# Patient Record
Sex: FEMALE | Race: BLACK OR AFRICAN AMERICAN | NOT HISPANIC OR LATINO
[De-identification: names, ages, dates, MRNs, and addresses within clinical notes are randomized per-mention and may not be internally consistent; named-entity substitution may affect disease eponyms.]

---

## 2020-02-21 ENCOUNTER — TRANSCRIPTION ENCOUNTER (OUTPATIENT)
Age: 85
End: 2020-02-21

## 2020-02-22 ENCOUNTER — INPATIENT (INPATIENT)
Facility: HOSPITAL | Age: 85
LOS: 2 days | Discharge: NURSING, REHAB OR LONG-TERM CARE | End: 2020-02-25
Attending: ORTHOPAEDIC SURGERY | Admitting: ORTHOPAEDIC SURGERY
Payer: MEDICARE

## 2020-02-22 ENCOUNTER — RESULT REVIEW (OUTPATIENT)
Age: 85
End: 2020-02-22

## 2020-02-22 VITALS
TEMPERATURE: 98 F | RESPIRATION RATE: 18 BRPM | OXYGEN SATURATION: 97 % | DIASTOLIC BLOOD PRESSURE: 69 MMHG | SYSTOLIC BLOOD PRESSURE: 167 MMHG | HEART RATE: 75 BPM | WEIGHT: 149.91 LBS

## 2020-02-22 LAB
24R-OH-CALCIDIOL SERPL-MCNC: 23.2 NG/ML — LOW (ref 30–80)
ABO RH CONFIRMATION: SIGNIFICANT CHANGE UP
ALBUMIN SERPL ELPH-MCNC: 3.5 G/DL — SIGNIFICANT CHANGE UP (ref 3.3–5)
ALBUMIN SERPL ELPH-MCNC: 3.6 G/DL — SIGNIFICANT CHANGE UP (ref 3.3–5)
ALP SERPL-CCNC: 112 U/L — SIGNIFICANT CHANGE UP (ref 40–120)
ALT FLD-CCNC: 19 U/L — SIGNIFICANT CHANGE UP (ref 12–78)
ANION GAP SERPL CALC-SCNC: 5 MMOL/L — SIGNIFICANT CHANGE UP (ref 5–17)
APTT BLD: 29.6 SEC — SIGNIFICANT CHANGE UP (ref 28.5–37)
AST SERPL-CCNC: 18 U/L — SIGNIFICANT CHANGE UP (ref 15–37)
BASOPHILS # BLD AUTO: 0.02 K/UL — SIGNIFICANT CHANGE UP (ref 0–0.2)
BASOPHILS NFR BLD AUTO: 0.1 % — SIGNIFICANT CHANGE UP (ref 0–2)
BILIRUB SERPL-MCNC: 0.4 MG/DL — SIGNIFICANT CHANGE UP (ref 0.2–1.2)
BLD GP AB SCN SERPL QL: SIGNIFICANT CHANGE UP
BUN SERPL-MCNC: 13 MG/DL — SIGNIFICANT CHANGE UP (ref 7–23)
CALCIUM SERPL-MCNC: 10.6 MG/DL — HIGH (ref 8.5–10.1)
CHLORIDE SERPL-SCNC: 111 MMOL/L — HIGH (ref 96–108)
CO2 SERPL-SCNC: 25 MMOL/L — SIGNIFICANT CHANGE UP (ref 22–31)
CREAT SERPL-MCNC: 0.72 MG/DL — SIGNIFICANT CHANGE UP (ref 0.5–1.3)
EOSINOPHIL # BLD AUTO: 0.01 K/UL — SIGNIFICANT CHANGE UP (ref 0–0.5)
EOSINOPHIL NFR BLD AUTO: 0.1 % — SIGNIFICANT CHANGE UP (ref 0–6)
GLUCOSE SERPL-MCNC: 121 MG/DL — HIGH (ref 70–99)
HCT VFR BLD CALC: 42.7 % — SIGNIFICANT CHANGE UP (ref 34.5–45)
HGB BLD-MCNC: 13.9 G/DL — SIGNIFICANT CHANGE UP (ref 11.5–15.5)
IMM GRANULOCYTES NFR BLD AUTO: 0.5 % — SIGNIFICANT CHANGE UP (ref 0–1.5)
INR BLD: 1.17 RATIO — HIGH (ref 0.88–1.16)
LYMPHOCYTES # BLD AUTO: 1.07 K/UL — SIGNIFICANT CHANGE UP (ref 1–3.3)
LYMPHOCYTES # BLD AUTO: 7 % — LOW (ref 13–44)
MCHC RBC-ENTMCNC: 30.4 PG — SIGNIFICANT CHANGE UP (ref 27–34)
MCHC RBC-ENTMCNC: 32.6 GM/DL — SIGNIFICANT CHANGE UP (ref 32–36)
MCV RBC AUTO: 93.4 FL — SIGNIFICANT CHANGE UP (ref 80–100)
MONOCYTES # BLD AUTO: 0.72 K/UL — SIGNIFICANT CHANGE UP (ref 0–0.9)
MONOCYTES NFR BLD AUTO: 4.7 % — SIGNIFICANT CHANGE UP (ref 2–14)
NEUTROPHILS # BLD AUTO: 13.29 K/UL — HIGH (ref 1.8–7.4)
NEUTROPHILS NFR BLD AUTO: 87.6 % — HIGH (ref 43–77)
NRBC # BLD: 0 /100 WBCS — SIGNIFICANT CHANGE UP (ref 0–0)
PLATELET # BLD AUTO: 253 K/UL — SIGNIFICANT CHANGE UP (ref 150–400)
POTASSIUM SERPL-MCNC: 3.7 MMOL/L — SIGNIFICANT CHANGE UP (ref 3.5–5.3)
POTASSIUM SERPL-SCNC: 3.7 MMOL/L — SIGNIFICANT CHANGE UP (ref 3.5–5.3)
PROT SERPL-MCNC: 7.8 GM/DL — SIGNIFICANT CHANGE UP (ref 6–8.3)
PROTHROM AB SERPL-ACNC: 13.2 SEC — HIGH (ref 10–12.9)
RBC # BLD: 4.57 M/UL — SIGNIFICANT CHANGE UP (ref 3.8–5.2)
RBC # FLD: 13.2 % — SIGNIFICANT CHANGE UP (ref 10.3–14.5)
SODIUM SERPL-SCNC: 141 MMOL/L — SIGNIFICANT CHANGE UP (ref 135–145)
WBC # BLD: 15.19 K/UL — HIGH (ref 3.8–10.5)
WBC # FLD AUTO: 15.19 K/UL — HIGH (ref 3.8–10.5)

## 2020-02-22 PROCEDURE — 71045 X-RAY EXAM CHEST 1 VIEW: CPT | Mod: 26

## 2020-02-22 PROCEDURE — 99221 1ST HOSP IP/OBS SF/LOW 40: CPT | Mod: AI,57,GC

## 2020-02-22 PROCEDURE — 72170 X-RAY EXAM OF PELVIS: CPT | Mod: 26,59

## 2020-02-22 PROCEDURE — 27236 TREAT THIGH FRACTURE: CPT | Mod: RT

## 2020-02-22 PROCEDURE — 73502 X-RAY EXAM HIP UNI 2-3 VIEWS: CPT | Mod: 26,RT

## 2020-02-22 PROCEDURE — 99223 1ST HOSP IP/OBS HIGH 75: CPT

## 2020-02-22 PROCEDURE — 88305 TISSUE EXAM BY PATHOLOGIST: CPT | Mod: 26

## 2020-02-22 PROCEDURE — 99285 EMERGENCY DEPT VISIT HI MDM: CPT

## 2020-02-22 PROCEDURE — 93010 ELECTROCARDIOGRAM REPORT: CPT

## 2020-02-22 PROCEDURE — 88311 DECALCIFY TISSUE: CPT | Mod: 26

## 2020-02-22 PROCEDURE — 73552 X-RAY EXAM OF FEMUR 2/>: CPT | Mod: 26,RT

## 2020-02-22 RX ORDER — CHOLECALCIFEROL (VITAMIN D3) 125 MCG
2000 CAPSULE ORAL DAILY
Refills: 0 | Status: DISCONTINUED | OUTPATIENT
Start: 2020-02-22 | End: 2020-02-25

## 2020-02-22 RX ORDER — CHLORHEXIDINE GLUCONATE 213 G/1000ML
1 SOLUTION TOPICAL DAILY
Refills: 0 | Status: DISCONTINUED | OUTPATIENT
Start: 2020-02-22 | End: 2020-02-22

## 2020-02-22 RX ORDER — LOSARTAN POTASSIUM 100 MG/1
25 TABLET, FILM COATED ORAL DAILY
Refills: 0 | Status: DISCONTINUED | OUTPATIENT
Start: 2020-02-22 | End: 2020-02-22

## 2020-02-22 RX ORDER — CEFAZOLIN SODIUM 1 G
2000 VIAL (EA) INJECTION EVERY 8 HOURS
Refills: 0 | Status: COMPLETED | OUTPATIENT
Start: 2020-02-22 | End: 2020-02-23

## 2020-02-22 RX ORDER — LOSARTAN POTASSIUM 100 MG/1
25 TABLET, FILM COATED ORAL DAILY
Refills: 0 | Status: DISCONTINUED | OUTPATIENT
Start: 2020-02-22 | End: 2020-02-25

## 2020-02-22 RX ORDER — ONDANSETRON 8 MG/1
4 TABLET, FILM COATED ORAL EVERY 6 HOURS
Refills: 0 | Status: DISCONTINUED | OUTPATIENT
Start: 2020-02-22 | End: 2020-02-25

## 2020-02-22 RX ORDER — METOCLOPRAMIDE HCL 10 MG
10 TABLET ORAL ONCE
Refills: 0 | Status: DISCONTINUED | OUTPATIENT
Start: 2020-02-22 | End: 2020-02-23

## 2020-02-22 RX ORDER — OXYCODONE HYDROCHLORIDE 5 MG/1
5 TABLET ORAL EVERY 4 HOURS
Refills: 0 | Status: DISCONTINUED | OUTPATIENT
Start: 2020-02-22 | End: 2020-02-25

## 2020-02-22 RX ORDER — OXYCODONE HYDROCHLORIDE 5 MG/1
2.5 TABLET ORAL EVERY 4 HOURS
Refills: 0 | Status: DISCONTINUED | OUTPATIENT
Start: 2020-02-22 | End: 2020-02-22

## 2020-02-22 RX ORDER — METOPROLOL TARTRATE 50 MG
25 TABLET ORAL
Refills: 0 | Status: DISCONTINUED | OUTPATIENT
Start: 2020-02-22 | End: 2020-02-22

## 2020-02-22 RX ORDER — ENOXAPARIN SODIUM 100 MG/ML
40 INJECTION SUBCUTANEOUS EVERY 24 HOURS
Refills: 0 | Status: DISCONTINUED | OUTPATIENT
Start: 2020-02-23 | End: 2020-02-25

## 2020-02-22 RX ORDER — ACETAMINOPHEN 500 MG
975 TABLET ORAL EVERY 8 HOURS
Refills: 0 | Status: DISCONTINUED | OUTPATIENT
Start: 2020-02-22 | End: 2020-02-25

## 2020-02-22 RX ORDER — OXYCODONE AND ACETAMINOPHEN 5; 325 MG/1; MG/1
1 TABLET ORAL ONCE
Refills: 0 | Status: DISCONTINUED | OUTPATIENT
Start: 2020-02-22 | End: 2020-02-22

## 2020-02-22 RX ORDER — LOSARTAN POTASSIUM 100 MG/1
1 TABLET, FILM COATED ORAL
Qty: 0 | Refills: 0 | DISCHARGE

## 2020-02-22 RX ORDER — SIMVASTATIN 20 MG/1
1 TABLET, FILM COATED ORAL
Qty: 0 | Refills: 0 | DISCHARGE

## 2020-02-22 RX ORDER — METOCLOPRAMIDE HCL 10 MG
5 TABLET ORAL EVERY 6 HOURS
Refills: 0 | Status: DISCONTINUED | OUTPATIENT
Start: 2020-02-22 | End: 2020-02-22

## 2020-02-22 RX ORDER — SODIUM CHLORIDE 9 MG/ML
1000 INJECTION, SOLUTION INTRAVENOUS
Refills: 0 | Status: DISCONTINUED | OUTPATIENT
Start: 2020-02-22 | End: 2020-02-22

## 2020-02-22 RX ORDER — OXYCODONE HYDROCHLORIDE 5 MG/1
2.5 TABLET ORAL EVERY 4 HOURS
Refills: 0 | Status: DISCONTINUED | OUTPATIENT
Start: 2020-02-22 | End: 2020-02-25

## 2020-02-22 RX ORDER — ACETAMINOPHEN 500 MG
975 TABLET ORAL EVERY 8 HOURS
Refills: 0 | Status: DISCONTINUED | OUTPATIENT
Start: 2020-02-22 | End: 2020-02-22

## 2020-02-22 RX ORDER — OXYCODONE HYDROCHLORIDE 5 MG/1
5 TABLET ORAL EVERY 4 HOURS
Refills: 0 | Status: DISCONTINUED | OUTPATIENT
Start: 2020-02-22 | End: 2020-02-22

## 2020-02-22 RX ORDER — SIMVASTATIN 20 MG/1
10 TABLET, FILM COATED ORAL AT BEDTIME
Refills: 0 | Status: DISCONTINUED | OUTPATIENT
Start: 2020-02-22 | End: 2020-02-22

## 2020-02-22 RX ORDER — LANOLIN ALCOHOL/MO/W.PET/CERES
3 CREAM (GRAM) TOPICAL AT BEDTIME
Refills: 0 | Status: DISCONTINUED | OUTPATIENT
Start: 2020-02-22 | End: 2020-02-25

## 2020-02-22 RX ORDER — SODIUM CHLORIDE 9 MG/ML
1000 INJECTION, SOLUTION INTRAVENOUS
Refills: 0 | Status: DISCONTINUED | OUTPATIENT
Start: 2020-02-22 | End: 2020-02-23

## 2020-02-22 RX ORDER — METOPROLOL TARTRATE 50 MG
25 TABLET ORAL
Refills: 0 | Status: DISCONTINUED | OUTPATIENT
Start: 2020-02-22 | End: 2020-02-25

## 2020-02-22 RX ORDER — HYDROMORPHONE HYDROCHLORIDE 2 MG/ML
0.5 INJECTION INTRAMUSCULAR; INTRAVENOUS; SUBCUTANEOUS
Refills: 0 | Status: DISCONTINUED | OUTPATIENT
Start: 2020-02-22 | End: 2020-02-23

## 2020-02-22 RX ORDER — SODIUM CHLORIDE 9 MG/ML
1000 INJECTION INTRAMUSCULAR; INTRAVENOUS; SUBCUTANEOUS
Refills: 0 | Status: DISCONTINUED | OUTPATIENT
Start: 2020-02-22 | End: 2020-02-23

## 2020-02-22 RX ORDER — METOPROLOL TARTRATE 50 MG
1 TABLET ORAL
Qty: 0 | Refills: 0 | DISCHARGE

## 2020-02-22 RX ORDER — LANOLIN ALCOHOL/MO/W.PET/CERES
3 CREAM (GRAM) TOPICAL AT BEDTIME
Refills: 0 | Status: DISCONTINUED | OUTPATIENT
Start: 2020-02-22 | End: 2020-02-22

## 2020-02-22 RX ADMIN — OXYCODONE HYDROCHLORIDE 5 MILLIGRAM(S): 5 TABLET ORAL at 19:30

## 2020-02-22 RX ADMIN — CHLORHEXIDINE GLUCONATE 1 APPLICATION(S): 213 SOLUTION TOPICAL at 19:10

## 2020-02-22 RX ADMIN — HYDROMORPHONE HYDROCHLORIDE 0.5 MILLIGRAM(S): 2 INJECTION INTRAMUSCULAR; INTRAVENOUS; SUBCUTANEOUS at 23:10

## 2020-02-22 RX ADMIN — OXYCODONE AND ACETAMINOPHEN 1 TABLET(S): 5; 325 TABLET ORAL at 11:30

## 2020-02-22 RX ADMIN — HYDROMORPHONE HYDROCHLORIDE 0.5 MILLIGRAM(S): 2 INJECTION INTRAMUSCULAR; INTRAVENOUS; SUBCUTANEOUS at 23:27

## 2020-02-22 RX ADMIN — OXYCODONE AND ACETAMINOPHEN 1 TABLET(S): 5; 325 TABLET ORAL at 13:00

## 2020-02-22 RX ADMIN — Medication 25 MILLIGRAM(S): at 17:05

## 2020-02-22 RX ADMIN — LOSARTAN POTASSIUM 25 MILLIGRAM(S): 100 TABLET, FILM COATED ORAL at 18:51

## 2020-02-22 RX ADMIN — OXYCODONE HYDROCHLORIDE 5 MILLIGRAM(S): 5 TABLET ORAL at 18:49

## 2020-02-22 RX ADMIN — OXYCODONE HYDROCHLORIDE 5 MILLIGRAM(S): 5 TABLET ORAL at 14:51

## 2020-02-22 RX ADMIN — OXYCODONE HYDROCHLORIDE 5 MILLIGRAM(S): 5 TABLET ORAL at 14:18

## 2020-02-22 RX ADMIN — Medication 1 TABLET(S): at 18:49

## 2020-02-22 RX ADMIN — SODIUM CHLORIDE 75 MILLILITER(S): 9 INJECTION, SOLUTION INTRAVENOUS at 23:10

## 2020-02-22 RX ADMIN — Medication 975 MILLIGRAM(S): at 16:56

## 2020-02-22 NOTE — ED ADULT NURSE REASSESSMENT NOTE - NS ED NURSE REASSESS COMMENT FT1
Report given to receiving RN Skylar, pts history, current condition and reason for admission discussed, safety concerns addressed and reviewed, medications administered and plan of care reviewed, IV site checked for patency, and shows no signs or symptoms of infiltrate, dressing is clean dry and intact, pt is aware of plan of care. Pt education deemed successful at time of report after patient demonstrates successful teach back for proficiency.

## 2020-02-22 NOTE — H&P ADULT - HISTORY OF PRESENT ILLNESS
86yFemale c/o R hip pain s/p mechanical fall out of bed this morning, as per daughter at bedside. Patient denies head hit or LOC. Patient denies numbness or tingling in the RLE. Patient denies any other injuries.    PMH:  HTN (hypertension)    PSH: L total hip arthroplasty 2013    AH: [facility]    Meds: See med rec

## 2020-02-22 NOTE — CONSULT NOTE ADULT - SUBJECTIVE AND OBJECTIVE BOX
85 yo hypertensive female p/w right hip fracture status post fall.       PAST MEDICAL & SURGICAL HISTORY:  HTN (hypertension)      REVIEW OF SYSTEMS:    CONSTITUTIONAL: No fever, weight loss, or fatigue  EYES: No eye pain, visual disturbances, or discharge  ENMT:  No difficulty hearing, tinnitus, vertigo; No sinus or throat pain  NECK: No pain or stiffness  BREASTS: No pain, masses, or nipple discharge  RESPIRATORY: No cough, wheezing, chills or hemoptysis; No shortness of breath  CARDIOVASCULAR: No chest pain, palpitations, dizziness, or leg swelling  GASTROINTESTINAL: No abdominal or epigastric pain. No nausea, vomiting, or hematemesis; No diarrhea or constipation. No melena or hematochezia.  GENITOURINARY: No dysuria, frequency, hematuria, or incontinence  NEUROLOGICAL: No headaches, memory loss, loss of strength, numbness, or tremors  SKIN: No itching, burning, rashes, or lesions   LYMPH NODES: No enlarged glands  ENDOCRINE: No heat or cold intolerance; No hair loss  MUSCULOSKELETAL: No joint pain or swelling; No muscle, back, or extremity pain  PSYCHIATRIC: No depression, anxiety, mood swings, or difficulty sleeping  HEME/LYMPH: No easy bruising, or bleeding gums  ALLERGY AND IMMUNOLOGIC: No hives or eczema      MEDICATIONS  (STANDING):  acetaminophen   Tablet .. 975 milliGRAM(s) Oral every 8 hours  lactated ringers. 1000 milliLiter(s) (125 mL/Hr) IV Continuous <Continuous>    MEDICATIONS  (PRN):  melatonin 3 milliGRAM(s) Oral at bedtime PRN Insomnia  metoclopramide Injectable 5 milliGRAM(s) IV Push every 6 hours PRN Nausea and/or Vomiting  oxyCODONE    IR 2.5 milliGRAM(s) Oral every 4 hours PRN Moderate Pain (4 - 6)  oxyCODONE    IR 5 milliGRAM(s) Oral every 4 hours PRN Severe Pain (7 - 10)      Allergies    Allergy Status Unknown    Intolerances        SOCIAL HISTORY:    FAMILY HISTORY:      Vital Signs Last 24 Hrs  T(C): 36.8 (22 Feb 2020 10:46), Max: 36.8 (22 Feb 2020 10:46)  T(F): 98.3 (22 Feb 2020 10:46), Max: 98.3 (22 Feb 2020 10:46)  HR: 75 (22 Feb 2020 10:46) (75 - 75)  BP: 167/69 (22 Feb 2020 10:46) (167/69 - 167/69)  BP(mean): --  RR: 18 (22 Feb 2020 10:46) (18 - 18)  SpO2: 97% (22 Feb 2020 10:46) (97% - 97%)    PHYSICAL EXAM:    GENERAL: NAD, well-groomed, well-developed  HEAD:  Atraumatic, Normocephalic  EYES: EOMI, PERRLA, conjunctiva and sclera clear  ENMT: No tonsillar erythema, exudates, or enlargement; Moist mucous membranes, Good dentition, No lesions  NECK: Supple, No JVD, Normal thyroid  NERVOUS SYSTEM:  Alert & Oriented X3, Good concentration; Motor Strength 5/5 B/L upper and lower extremities; DTRs 2+ intact and symmetric  CHEST/LUNG: Clear to percussion bilaterally; No rales, rhonchi, wheezing, or rubs  HEART: Regular rate and rhythm; No murmurs, rubs, or gallops  ABDOMEN: Soft, Nontender, Nondistended; Bowel sounds present  EXTREMITIES:  2+ Peripheral Pulses, No clubbing, cyanosis, or edema  LYMPH: No lymphadenopathy notedRECTAL: No masses, prostate normal size and smooth, Guiac negative   BREAST: No palpatble masses, skin no lesions no retractions, no discharages. adenexa no palpable masses noted   GYN: uterus normal size, adenexa no palpable masses, no CMT, no uterine discharge   SKIN: No rashes or lesions 85 yo female w/ history of HTN and HLD p/w right hip fracture status post fall. She is a poor historian and unable to provide further detail. She reports mild right hip pain.      PAST MEDICAL & SURGICAL HISTORY:  HTN (hypertension)  HLD    PSH:  Left hip replacement     REVIEW OF SYSTEMS:    CONSTITUTIONAL: No fever, weight loss, or fatigue  EYES: No eye pain, visual disturbances, or discharge  ENMT:  No difficulty hearing, tinnitus, vertigo; No sinus or throat pain  NECK: No pain or stiffness  BREASTS: No pain, masses, or nipple discharge  RESPIRATORY: No cough, wheezing, chills or hemoptysis; No shortness of breath  CARDIOVASCULAR: No chest pain, palpitations, dizziness, or leg swelling  GASTROINTESTINAL: No abdominal or epigastric pain. No nausea, vomiting, or hematemesis; No diarrhea or constipation. No melena or hematochezia.  GENITOURINARY: No dysuria, frequency, hematuria, or incontinence  NEUROLOGICAL: No headaches, memory loss, loss of strength, numbness, or tremors  SKIN: No itching, burning, rashes, or lesions   LYMPH NODES: No enlarged glands  ENDOCRINE: No heat or cold intolerance; No hair loss  MUSCULOSKELETAL:  right hip pain  PSYCHIATRIC: No depression, anxiety, mood swings, or difficulty sleeping  HEME/LYMPH: No easy bruising, or bleeding gums  ALLERGY AND IMMUNOLOGIC: No hives or eczema      MEDICATIONS  (STANDING):  acetaminophen   Tablet .. 975 milliGRAM(s) Oral every 8 hours  lactated ringers. 1000 milliLiter(s) (125 mL/Hr) IV Continuous <Continuous>    MEDICATIONS  (PRN):  melatonin 3 milliGRAM(s) Oral at bedtime PRN Insomnia  metoclopramide Injectable 5 milliGRAM(s) IV Push every 6 hours PRN Nausea and/or Vomiting  oxyCODONE    IR 2.5 milliGRAM(s) Oral every 4 hours PRN Moderate Pain (4 - 6)  oxyCODONE    IR 5 milliGRAM(s) Oral every 4 hours PRN Severe Pain (7 - 10)      Allergies    Allergy Status Unknown    Intolerances        SOCIAL HISTORY: Denied tobacco or ETOH use    FAMILY HISTORY: Pt denied any relevant history       Vital Signs Last 24 Hrs  T(C): 36.8 (22 Feb 2020 10:46), Max: 36.8 (22 Feb 2020 10:46)  T(F): 98.3 (22 Feb 2020 10:46), Max: 98.3 (22 Feb 2020 10:46)  HR: 75 (22 Feb 2020 10:46) (75 - 75)  BP: 167/69 (22 Feb 2020 10:46) (167/69 - 167/69)  BP(mean): --  RR: 18 (22 Feb 2020 10:46) (18 - 18)  SpO2: 97% (22 Feb 2020 10:46) (97% - 97%)    PHYSICAL EXAM:    GENERAL: NAD, well-groomed, well-developed  HEAD:  Atraumatic, Normocephalic  EYES: EOMI, PERRLA   NECK: Supple   NERVOUS SYSTEM:  Alert    CHEST/LUNG: Clear to percussion bilaterally; No rales, rhonchi, wheezing, or rubs  HEART: Regular rate and rhythm; No murmurs, rubs, or gallops  ABDOMEN: Soft, Nontender, Nondistended; Bowel sounds present  EXTREMITIES:  No clubbing, cyanosis, or edema

## 2020-02-22 NOTE — H&P ADULT - BIRTH SEX
· You are considered to be contagious until you have been on antibiotics for 24 hours. · You can return to school and/or work once on antibiotics for 24 hours. · Can use over the counter Tylenol/Ibuprofen as needed.   · Push fluids- warm or cool liquids the healthcare provider.    · Don't give your child any other medicine without first asking the healthcare provider. · If your child received an antibiotic shot, your child should not need any other antibiotics.   Follow these tips when giving fever medici like eating, don’t give him or her salty or spicy foods. These can irritate the throat. · Older children may prefer ice chips, cold drinks, frozen desserts, or popsicles. · Older children may also like warm chicken soup or beverages with lemon and honey. product maker’s directions for proper use. If you don’t feel comfortable taking a rectal temperature, use another method. When you talk to your child’s healthcare provider, tell him or her which method you used to take your child’s temperature.   Here are g Female

## 2020-02-22 NOTE — ED ADULT NURSE NOTE - NSIMPLEMENTINTERV_GEN_ALL_ED
Implemented All Universal Safety Interventions:  [place] to call system. Call bell, personal items and telephone within reach. Instruct patient to call for assistance. Room bathroom lighting operational. Non-slip footwear when patient is off stretcher. Physically safe environment: no spills, clutter or unnecessary equipment. Stretcher in lowest position, wheels locked, appropriate side rails in place.

## 2020-02-22 NOTE — ED PROVIDER NOTE - OBJECTIVE STATEMENT
85 y/o F with PMHx HTN presents s/p mechanical fall. per pt daughter states her mother fell in her room, hitting her right side of her head and right hip. no head strike, LOC, or other complaints. Of note pt has had a UTI x8 days ago and is currently on antibiotics. 87 y/o F with PMHx HTN presents s/p mechanical fall. per pt daughter states her mother fell in her room getting out of bed earlier today hitting her right side of her right hip. no head strike, LOC, or other complaints. Has had hx of L. hip fracture. Was not on floor more than 5 minutes. No numbness or tingling, no other complaints.

## 2020-02-22 NOTE — H&P ADULT - ASSESSMENT
86F w/ displaced right femoral neck fracture  NPO, IVF  Analgesia  DVT ppx held  NWB  d/w patient need for surgical intervention for ambulation  F/U labs  F/U Medicine regarding clearance for OR  Plan for definitive surgical fixation this evening  Will d/w attending any further recommendations

## 2020-02-22 NOTE — H&P ADULT - NSHPSOURCEINFORD_GEN_ALL_CORE
[facility], [place], Emergency Department    [address] [place] [id number]    Phone:  [phone]                                       Jaskaran Pastor   MRN: [id number]    Department:  [facility], Emergency Department   Date of Visit:  4/25/2017           After Visit Summary Signature Page     I have received my discharge instructions, and my questions have been answered. I have discussed any challenges I see with this plan with the nurse or doctor.    ..........................................................................................................................................  Patient/Patient Representative Signature      ..........................................................................................................................................  Patient Representative Print Name and Relationship to Patient    ..................................................               ................................................  Date                                            Time    ..........................................................................................................................................  Reviewed by Signature/Title    ...................................................              ..............................................  Date                                                            Time           Patient/Chart(s)

## 2020-02-22 NOTE — CONSULT NOTE ADULT - ASSESSMENT
87 yo female w/ history of HTN and HLD p/w right hip fracture status post fall. She is a poor historian and unable to provide further detail. She reports mild right hip pain.    Right hip fracture   - ortho planning on hemiarthroplasty  - would recommend limiting IVF as patient may become overloaded (though she has no history of cardiac disease)  - Pt denied any history of CVA, CAD, MI, PVD, CHF, DM or CKD. She has no history of chest pain w/ exertion but is too sedentary to assess METs. EKG showed NSR @ 82 bpm. CXR showed mild chronic apical thickening & cardiomegaly but no active lung or pleural finding. She is at intermediate cardiac risk for an intermediate risk procedure and can proceed to the OR without further interventions.      Luekocyotisis    - likely reactive to fracture     HLD  - c/w simvastatin    HTN  - c/w metoprolol and losartan    Recent UTI  - patient has 2 more days of Bactrim, which was started by her PMD 87 yo female w/ history of HTN and HLD p/w right hip fracture status post fall. She is a poor historian and unable to provide further detail. She reports mild right hip pain.    Right hip fracture   - ortho planning on hemiarthroplasty  - would recommend limiting IVF as patient may become overloaded (though she has no history of cardiac disease)  - Pt denied any history of CVA, CAD, MI, PVD, CHF, DM or CKD. She has no history of chest pain w/ exertion but is too sedentary to assess METs. EKG showed NSR @ 82 bpm. CXR showed mild chronic apical thickening & cardiomegaly but no active lung or pleural finding. She is at intermediate cardiac risk for an intermediate risk procedure and can proceed to the OR without further interventions.      Leukocytosis     - likely reactive to fracture     HLD  - c/w simvastatin    HTN  - c/w metoprolol and losartan    Recent UTI  - patient has 2 more days of Bactrim, which was started by her PMD

## 2020-02-22 NOTE — ED ADULT NURSE NOTE - OBJECTIVE STATEMENT
Pt is an 86YOF who was at home when she fell, injuring her right leg, pt denies striking her head. Pt has no visible obvious injuries, pt is complaining of pain in her right leg, pt states she is not able to weight bare and move her leg without discomfort.

## 2020-02-22 NOTE — H&P ADULT - NSHPPHYSICALEXAM_GEN_ALL_CORE
T(C): 36.8 (02-22-20 @ 10:46)  HR: 75 (02-22-20 @ 10:46)  BP: 167/69 (02-22-20 @ 10:46)  RR: 18 (02-22-20 @ 10:46)  SpO2: 97% (02-22-20 @ 10:46)  Wt(kg): --    PE RLE:  Skin intact, no ecchymosis, SILT L2-S1, +EHL/FHL/TA/Gastroc, +Knee/ankle ROM, hip ROM limited 2/2 pain, DP+, soft compartments, no calf ttp, +log roll.    Secondary:  No TTP over bony landmarks, SILT BL, ROM intact BL, distal pulses palpable.

## 2020-02-22 NOTE — ED ADULT TRIAGE NOTE - CHIEF COMPLAINT QUOTE
Pt BIBA with c/o mechanical fall with c/o right leg pain, denies head trauma, denies anticoagulant use.

## 2020-02-23 ENCOUNTER — TRANSCRIPTION ENCOUNTER (OUTPATIENT)
Age: 85
End: 2020-02-23

## 2020-02-23 LAB
ANION GAP SERPL CALC-SCNC: 7 MMOL/L — SIGNIFICANT CHANGE UP (ref 5–17)
BASOPHILS # BLD AUTO: 0.04 K/UL — SIGNIFICANT CHANGE UP (ref 0–0.2)
BASOPHILS NFR BLD AUTO: 0.2 % — SIGNIFICANT CHANGE UP (ref 0–2)
BUN SERPL-MCNC: 11 MG/DL — SIGNIFICANT CHANGE UP (ref 7–23)
CALCIUM SERPL-MCNC: 10.5 MG/DL — HIGH (ref 8.5–10.1)
CHLORIDE SERPL-SCNC: 106 MMOL/L — SIGNIFICANT CHANGE UP (ref 96–108)
CO2 SERPL-SCNC: 24 MMOL/L — SIGNIFICANT CHANGE UP (ref 22–31)
CREAT SERPL-MCNC: 0.92 MG/DL — SIGNIFICANT CHANGE UP (ref 0.5–1.3)
EOSINOPHIL # BLD AUTO: 0.06 K/UL — SIGNIFICANT CHANGE UP (ref 0–0.5)
EOSINOPHIL NFR BLD AUTO: 0.4 % — SIGNIFICANT CHANGE UP (ref 0–6)
GLUCOSE SERPL-MCNC: 145 MG/DL — HIGH (ref 70–99)
HCT VFR BLD CALC: 40.5 % — SIGNIFICANT CHANGE UP (ref 34.5–45)
HGB BLD-MCNC: 12.8 G/DL — SIGNIFICANT CHANGE UP (ref 11.5–15.5)
IMM GRANULOCYTES NFR BLD AUTO: 0.6 % — SIGNIFICANT CHANGE UP (ref 0–1.5)
LYMPHOCYTES # BLD AUTO: 1.21 K/UL — SIGNIFICANT CHANGE UP (ref 1–3.3)
LYMPHOCYTES # BLD AUTO: 7.4 % — LOW (ref 13–44)
MCHC RBC-ENTMCNC: 29.9 PG — SIGNIFICANT CHANGE UP (ref 27–34)
MCHC RBC-ENTMCNC: 31.6 GM/DL — LOW (ref 32–36)
MCV RBC AUTO: 94.6 FL — SIGNIFICANT CHANGE UP (ref 80–100)
MONOCYTES # BLD AUTO: 0.95 K/UL — HIGH (ref 0–0.9)
MONOCYTES NFR BLD AUTO: 5.8 % — SIGNIFICANT CHANGE UP (ref 2–14)
NEUTROPHILS # BLD AUTO: 13.94 K/UL — HIGH (ref 1.8–7.4)
NEUTROPHILS NFR BLD AUTO: 85.6 % — HIGH (ref 43–77)
NRBC # BLD: 0 /100 WBCS — SIGNIFICANT CHANGE UP (ref 0–0)
PLATELET # BLD AUTO: 226 K/UL — SIGNIFICANT CHANGE UP (ref 150–400)
POTASSIUM SERPL-MCNC: 4.7 MMOL/L — SIGNIFICANT CHANGE UP (ref 3.5–5.3)
POTASSIUM SERPL-SCNC: 4.7 MMOL/L — SIGNIFICANT CHANGE UP (ref 3.5–5.3)
RBC # BLD: 4.28 M/UL — SIGNIFICANT CHANGE UP (ref 3.8–5.2)
RBC # FLD: 13.4 % — SIGNIFICANT CHANGE UP (ref 10.3–14.5)
SODIUM SERPL-SCNC: 137 MMOL/L — SIGNIFICANT CHANGE UP (ref 135–145)
WBC # BLD: 16.29 K/UL — HIGH (ref 3.8–10.5)
WBC # FLD AUTO: 16.29 K/UL — HIGH (ref 3.8–10.5)

## 2020-02-23 PROCEDURE — 99233 SBSQ HOSP IP/OBS HIGH 50: CPT

## 2020-02-23 RX ORDER — OXYCODONE HYDROCHLORIDE 5 MG/1
1 TABLET ORAL
Qty: 0 | Refills: 0 | DISCHARGE
Start: 2020-02-23

## 2020-02-23 RX ORDER — ENOXAPARIN SODIUM 100 MG/ML
40 INJECTION SUBCUTANEOUS
Qty: 0 | Refills: 0 | DISCHARGE
Start: 2020-02-23

## 2020-02-23 RX ORDER — POLYETHYLENE GLYCOL 3350 17 G/17G
17 POWDER, FOR SOLUTION ORAL DAILY
Refills: 0 | Status: DISCONTINUED | OUTPATIENT
Start: 2020-02-23 | End: 2020-02-25

## 2020-02-23 RX ORDER — BENZOCAINE AND MENTHOL 5; 1 G/100ML; G/100ML
1 LIQUID ORAL THREE TIMES A DAY
Refills: 0 | Status: DISCONTINUED | OUTPATIENT
Start: 2020-02-23 | End: 2020-02-25

## 2020-02-23 RX ORDER — SENNA PLUS 8.6 MG/1
2 TABLET ORAL AT BEDTIME
Refills: 0 | Status: DISCONTINUED | OUTPATIENT
Start: 2020-02-23 | End: 2020-02-25

## 2020-02-23 RX ORDER — MAGNESIUM HYDROXIDE 400 MG/1
30 TABLET, CHEWABLE ORAL DAILY
Refills: 0 | Status: DISCONTINUED | OUTPATIENT
Start: 2020-02-23 | End: 2020-02-25

## 2020-02-23 RX ORDER — MAGNESIUM HYDROXIDE 400 MG/1
30 TABLET, CHEWABLE ORAL
Qty: 0 | Refills: 0 | DISCHARGE
Start: 2020-02-23

## 2020-02-23 RX ADMIN — Medication 1 TABLET(S): at 11:33

## 2020-02-23 RX ADMIN — Medication 25 MILLIGRAM(S): at 05:51

## 2020-02-23 RX ADMIN — Medication 975 MILLIGRAM(S): at 05:52

## 2020-02-23 RX ADMIN — Medication 975 MILLIGRAM(S): at 22:09

## 2020-02-23 RX ADMIN — SODIUM CHLORIDE 75 MILLILITER(S): 9 INJECTION INTRAMUSCULAR; INTRAVENOUS; SUBCUTANEOUS at 00:51

## 2020-02-23 RX ADMIN — Medication 975 MILLIGRAM(S): at 13:00

## 2020-02-23 RX ADMIN — Medication 975 MILLIGRAM(S): at 06:50

## 2020-02-23 RX ADMIN — Medication 25 MILLIGRAM(S): at 17:36

## 2020-02-23 RX ADMIN — ENOXAPARIN SODIUM 40 MILLIGRAM(S): 100 INJECTION SUBCUTANEOUS at 11:33

## 2020-02-23 RX ADMIN — Medication 2000 UNIT(S): at 11:33

## 2020-02-23 RX ADMIN — POLYETHYLENE GLYCOL 3350 17 GRAM(S): 17 POWDER, FOR SOLUTION ORAL at 17:36

## 2020-02-23 RX ADMIN — Medication 3 MILLIGRAM(S): at 23:00

## 2020-02-23 RX ADMIN — Medication 975 MILLIGRAM(S): at 23:09

## 2020-02-23 RX ADMIN — Medication 100 MILLIGRAM(S): at 05:16

## 2020-02-23 RX ADMIN — Medication 100 MILLIGRAM(S): at 13:00

## 2020-02-23 RX ADMIN — Medication 975 MILLIGRAM(S): at 14:00

## 2020-02-23 RX ADMIN — LOSARTAN POTASSIUM 25 MILLIGRAM(S): 100 TABLET, FILM COATED ORAL at 05:51

## 2020-02-23 NOTE — DISCHARGE NOTE PROVIDER - CARE PROVIDER_API CALL
Epifanio Woodson)  Orthopaedic Surgery  [address], [address]  [place] [address]  Phone: [phone]  Fax: [phone]  Follow Up Time: 2 weeks

## 2020-02-23 NOTE — PHYSICAL THERAPY INITIAL EVALUATION ADULT - IMPAIRMENTS FOUND, PT EVAL
ergonomics and body mechanics/aerobic capacity/endurance/cognitive impairment/gait, locomotion, and balance

## 2020-02-23 NOTE — DISCHARGE NOTE PROVIDER - HOSPITAL COURSE
The patient is a 86 year old female status post right hip hemiarthroplasty for a Fracture after being admitted through the emergency department. The patient was medically optimized for the previously mentioned surgical procedure. The patient was taken to the operating room on date mentioned above. Prophylactic antibiotics were started before the procedure and continued for 24 hours.  There were no complications during the procedure and patient tolerated the procedure well.  The patient was transferred to recovery room in stable condition and subsequently to surgical floor.  Patient was placed on lovenox for anticoagulation.  All home medications were continued.  The patient received physical therapy daily and daily labs were followed.  The dressing was kept clean, dry, intact.  The rest of the hospital stay was unremarkable. The patient was discharged in stable condition to follow up as outpatient.

## 2020-02-23 NOTE — PHYSICAL THERAPY INITIAL EVALUATION ADULT - TRANSFER SAFETY CONCERNS NOTED: BED/CHAIR, REHAB EVAL
decreased safety awareness/decreased sequencing ability/decreased balance during turns/decreased weight-shifting ability

## 2020-02-23 NOTE — PHYSICAL THERAPY INITIAL EVALUATION ADULT - TRANSFER TRAINING, PT EVAL
Pt will perform sit to stand to sit transfers without LOB using rolling walker and min assist x1 by 4 weeks.

## 2020-02-23 NOTE — PHYSICAL THERAPY INITIAL EVALUATION ADULT - TINETTI GAIT TEST, REHAB EVAL
Indication of gait -0/1,   Step Length and height -1/2,   Foot Clearance -1/2,   Step Symmetry - 0/1,  Step Continuity -0/1,   Path -1/ 2,   Trunk -1/2,   Walking Time -0/1,   Total Score - 4/12

## 2020-02-23 NOTE — PHYSICAL THERAPY INITIAL EVALUATION ADULT - BALANCE TRAINING, PT EVAL
Pt will increase static/dynamic sitting balance to good and static/dynamic standing balance to good to perform all functional mobility without LOB, c a Rolling Walker and c cg of one person, by 4 weeks.

## 2020-02-23 NOTE — DISCHARGE NOTE PROVIDER - NSDCCPCAREPLAN_GEN_ALL_CORE_FT
PRINCIPAL DISCHARGE DIAGNOSIS  Diagnosis: Closed fracture of neck of right femur, initial encounter  Assessment and Plan of Treatment: 1.	Pain Control  2.	Walking with full weight bearing as tolerated, with assistive devices (walker/Cane as Needed)  3.	DVT Prophylaxis for 30 days  4.	PT as needed  5.	Follow up with Dr. Woodson as Outpatient in 10-14 Days after Discharge from the Hospital or Rehab. Call Office For Appointment.  6.	Remove Staples Post-Op Day 14, and Remove Dressing Post-Op Day 10, with Daily Dressing Changes as Need.  7.	Ice affected area as Needed  8.	Keep Dressing  Clean and dry. PRINCIPAL DISCHARGE DIAGNOSIS  Diagnosis: Closed fracture of neck of right femur, initial encounter  Assessment and Plan of Treatment: Diagnosis: Closed fracture of neck of right femur, initial encounter  Assessment and Plan of Treatment:   1.	Pain Control  2.	Walking with full weight bearing as tolerated, with assistive devices (walker/Cane as Needed), Posterior hip precautions Abduction pillow in bed  3.	DVT Prophylaxis for 30 days  4.	PT as needed  5.	Follow up with Dr. Woodson as Outpatient in 10-14 Days after Discharge from the Hospital or Rehab. Call Office For Appointment.  6.	Remove Staples Post-Op Day 14, and Remove Dressing Post-Op Day 10, with Daily Dressing Changes as Need.  7.	Ice affected area as Needed  8.	Keep Dressing  Clean and dry.

## 2020-02-23 NOTE — PHYSICAL THERAPY INITIAL EVALUATION ADULT - TINETTI BALANCE TEST, REHAB EVAL
Sitting Balance - 1/1 , Rises From Chair - 1/2, Attempts to rise - 1/2 , Immediate Standing Balance - 1/2,  Standing Balance - 1/2, Nudged - 1 /2, Eyes Closed - 1/1,  Turning 360 Deg - 0 /2, Sitting down - 1/2, Balance Score -8 /16

## 2020-02-23 NOTE — PROGRESS NOTE ADULT - ASSESSMENT
85 yo female w/ history of HTN and HLD p/w right hip fracture status post fall. She is a poor historian and unable to provide further detail. She reports mild right hip pain.    Right hip fracture   - status post R hemiarthroplasty on 2/21  - defer wound care, pain management and DVT prophylaxis to surgery    Hypovitaminosis D   - Vit D level 23.2  -  c/w Cholecalciferol     Leukocytosis     - likely reactive to fracture & surgery  - patient was on Ancef as per surgery    HLD  - recommend restarting simvastatin    HTN  - c/w metoprolol and losartan    Recent UTI  - patient had 2 more days of Bactrim, but was given Ancef today due to the surgery

## 2020-02-23 NOTE — PROGRESS NOTE ADULT - SUBJECTIVE AND OBJECTIVE BOX
87 yo female w/ history of HTN and HLD p/w right hip fracture status post fall. She is a poor historian and unable to provide further detail. She reports mild right hip pain. She is lying in bed in NAD.    MEDICATIONS  (STANDING):  acetaminophen   Tablet .. 975 milliGRAM(s) Oral every 8 hours  calcium carbonate 1250 mG  + Vitamin D (OsCal 500 + D) 1 Tablet(s) Oral daily  cholecalciferol 2000 Unit(s) Oral daily  enoxaparin Injectable 40 milliGRAM(s) SubCutaneous every 24 hours  losartan 25 milliGRAM(s) Oral daily  metoprolol tartrate 25 milliGRAM(s) Oral two times a day  polyethylene glycol 3350 17 Gram(s) Oral daily  senna 2 Tablet(s) Oral at bedtime    MEDICATIONS  (PRN):  benzocaine 15 mG/menthol 3.6 mG (Sugar-Free) Lozenge 1 Lozenge Oral three times a day PRN Sore Throat  magnesium hydroxide Suspension 30 milliLiter(s) Oral daily PRN Constipation  melatonin 3 milliGRAM(s) Oral at bedtime PRN Insomnia  ondansetron Injectable 4 milliGRAM(s) IV Push every 6 hours PRN Nausea and/or Vomiting  oxyCODONE    IR 2.5 milliGRAM(s) Oral every 4 hours PRN Moderate Pain (4 - 6)  oxyCODONE    IR 5 milliGRAM(s) Oral every 4 hours PRN Severe Pain (7 - 10)      Allergies    No Known Allergies    Intolerances        Vital Signs Last 24 Hrs  T(C): 36.8 (23 Feb 2020 17:23), Max: 37.5 (23 Feb 2020 13:35)  T(F): 98.3 (23 Feb 2020 17:23), Max: 99.5 (23 Feb 2020 13:35)  HR: 70 (23 Feb 2020 17:23) (65 - 99)  BP: 138/66 (23 Feb 2020 17:23) (129/65 - 193/86)  BP(mean): --  RR: 17 (23 Feb 2020 17:23) (16 - 19)  SpO2: 98% (23 Feb 2020 17:23) (92% - 100%)    PHYSICAL EXAM:  GENERAL: NAD, well-groomed, well-developed  HEAD:  Atraumatic, Normocephalic  EYES: EOMI, PERRLA,   NECK: Supple   NERVOUS SYSTEM: Alert   CHEST/LUNG: Clear to auscultation bilaterally; No rales, rhonchi, wheezing, or rubs  HEART: Regular rate and rhythm; No murmurs, rubs, or gallops  ABDOMEN: Soft, Nontender, Nondistended; Bowel sounds present  EXTREMITIES:  No clubbing, cyanosis, or edema       LABS:                        12.8   16.29 )-----------( 226      ( 23 Feb 2020 06:40 )             40.5     02-23    137  |  106  |  11  ----------------------------<  145<H>  4.7   |  24  |  0.92    Ca    10.5<H>      23 Feb 2020 06:40    TPro  x   /  Alb  3.6  /  TBili  x   /  DBili  x   /  AST  x   /  ALT  x   /  AlkPhos  x   02-22    PT/INR - ( 22 Feb 2020 13:50 )   PT: 13.2 sec;   INR: 1.17 ratio         PTT - ( 22 Feb 2020 13:50 )  PTT:29.6 sec    CAPILLARY BLOOD GLUCOSE    02-22-20 @ 07:01  -  02-23-20 @ 07:00  --------------------------------------------------------  IN:    Oral Fluid: 100 mL    sodium chloride 0.9%: 480 mL    Solution: 50 mL  Total IN: 630 mL    OUT:  Total OUT: 0 mL    Total NET: 630 mL      02-23-20 @ 07:01 - 02-23-20 @ 19:37  --------------------------------------------------------  IN:    Oral Fluid: 240 mL    sodium chloride 0.9%: 262.5 mL    Solution: 50 mL  Total IN: 552.5 mL    OUT:  Total OUT: 0 mL    Total NET: 552.5 mL

## 2020-02-23 NOTE — DISCHARGE NOTE PROVIDER - NSDCMRMEDTOKEN_GEN_ALL_CORE_FT
enoxaparin: 40 milligram(s) subcutaneous once a day  losartan 25 mg oral tablet: 1 tab(s) orally once a day  metoprolol tartrate 25 mg oral tablet: 1 tab(s) orally 2 times a day  oxyCODONE 5 mg oral tablet: 1 tab(s) orally every 4 hours, As needed, Severe Pain (7 - 10)  simvastatin 10 mg oral tablet: 1 tab(s) orally once a day (at bedtime)  sulfamethoxazole-trimethoprim SS: enoxaparin: 40 milligram(s) subcutaneous once a day  losartan 25 mg oral tablet: 1 tab(s) orally once a day  magnesium hydroxide 8% oral suspension: 30 milliliter(s) orally once a day, As needed, Constipation  metoprolol tartrate 25 mg oral tablet: 1 tab(s) orally 2 times a day  oxyCODONE 5 mg oral tablet: 1 tab(s) orally every 4 hours, As needed, Severe Pain (7 - 10)  simvastatin 10 mg oral tablet: 1 tab(s) orally once a day (at bedtime)  sulfamethoxazole-trimethoprim SS:

## 2020-02-23 NOTE — PHYSICAL THERAPY INITIAL EVALUATION ADULT - PLANNED THERAPY INTERVENTIONS, PT EVAL
postural re-education/transfer training/gait training/balance training/bed mobility training/ROM/strengthening

## 2020-02-23 NOTE — PHYSICAL THERAPY INITIAL EVALUATION ADULT - ADDITIONAL COMMENTS
Pt was able to ambulate c a straight cane for short distance c assist prior to admission. P has a home health aide for 6 hour/7 days. Information was obtained from daughter, Kenzie.

## 2020-02-23 NOTE — PHYSICAL THERAPY INITIAL EVALUATION ADULT - IMPAIRMENTS CONTRIBUTING TO GAIT DEVIATIONS, PT EVAL
decreased flexibility/impaired balance/pain/impaired postural control/cognition/narrow base of support/decreased strength

## 2020-02-23 NOTE — PHYSICAL THERAPY INITIAL EVALUATION ADULT - GAIT DEVIATIONS NOTED, PT EVAL
decreased vladimir/increased time in double stance/decreased weight-shifting ability/decreased stride length/decreased step length/decreased velocity of limb motion

## 2020-02-23 NOTE — DISCHARGE NOTE PROVIDER - NSDCFUADDAPPT_GEN_ALL_CORE_FT
Diagnosis: Closed fracture of neck of right femur, initial encounter  Assessment and Plan of Treatment:   1.	Pain Control  2.	Walking with full weight bearing as tolerated, with assistive devices (walker/Cane as Needed), Posterior hip precautions Abduction pillow in bed  3.	DVT Prophylaxis for 30 days  4.	PT as needed  5.	Follow up with Dr. Woodson as Outpatient in 10-14 Days after Discharge from the Hospital or Rehab. Call Office For Appointment.  6.	Remove Staples Post-Op Day 14, and Remove Dressing Post-Op Day 10, with Daily Dressing Changes as Need.  7.	Ice affected area as Needed  8.	Keep Dressing  Clean and dry.

## 2020-02-23 NOTE — PHYSICAL THERAPY INITIAL EVALUATION ADULT - CRITERIA FOR SKILLED THERAPEUTIC INTERVENTIONS
anticipated discharge recommendation/impairments found/predicted duration of therapy intervention/risk reduction/prevention/rehab potential/functional limitations in following categories

## 2020-02-24 LAB
ANION GAP SERPL CALC-SCNC: 6 MMOL/L — SIGNIFICANT CHANGE UP (ref 5–17)
BUN SERPL-MCNC: 19 MG/DL — SIGNIFICANT CHANGE UP (ref 7–23)
CALCIUM SERPL-MCNC: 11.4 MG/DL — HIGH (ref 8.5–10.1)
CHLORIDE SERPL-SCNC: 109 MMOL/L — HIGH (ref 96–108)
CO2 SERPL-SCNC: 26 MMOL/L — SIGNIFICANT CHANGE UP (ref 22–31)
CREAT SERPL-MCNC: 0.95 MG/DL — SIGNIFICANT CHANGE UP (ref 0.5–1.3)
GLUCOSE SERPL-MCNC: 129 MG/DL — HIGH (ref 70–99)
HCT VFR BLD CALC: 36.9 % — SIGNIFICANT CHANGE UP (ref 34.5–45)
HGB BLD-MCNC: 11.8 G/DL — SIGNIFICANT CHANGE UP (ref 11.5–15.5)
MCHC RBC-ENTMCNC: 30.3 PG — SIGNIFICANT CHANGE UP (ref 27–34)
MCHC RBC-ENTMCNC: 32 GM/DL — SIGNIFICANT CHANGE UP (ref 32–36)
MCV RBC AUTO: 94.9 FL — SIGNIFICANT CHANGE UP (ref 80–100)
NRBC # BLD: 0 /100 WBCS — SIGNIFICANT CHANGE UP (ref 0–0)
PLATELET # BLD AUTO: 207 K/UL — SIGNIFICANT CHANGE UP (ref 150–400)
POTASSIUM SERPL-MCNC: 4.5 MMOL/L — SIGNIFICANT CHANGE UP (ref 3.5–5.3)
POTASSIUM SERPL-SCNC: 4.5 MMOL/L — SIGNIFICANT CHANGE UP (ref 3.5–5.3)
RBC # BLD: 3.89 M/UL — SIGNIFICANT CHANGE UP (ref 3.8–5.2)
RBC # FLD: 13.8 % — SIGNIFICANT CHANGE UP (ref 10.3–14.5)
SODIUM SERPL-SCNC: 141 MMOL/L — SIGNIFICANT CHANGE UP (ref 135–145)
WBC # BLD: 16.8 K/UL — HIGH (ref 3.8–10.5)
WBC # FLD AUTO: 16.8 K/UL — HIGH (ref 3.8–10.5)

## 2020-02-24 PROCEDURE — 99232 SBSQ HOSP IP/OBS MODERATE 35: CPT

## 2020-02-24 RX ORDER — LACTULOSE 10 G/15ML
20 SOLUTION ORAL ONCE
Refills: 0 | Status: COMPLETED | OUTPATIENT
Start: 2020-02-24 | End: 2020-02-24

## 2020-02-24 RX ADMIN — Medication 1 TABLET(S): at 12:07

## 2020-02-24 RX ADMIN — LACTULOSE 20 GRAM(S): 10 SOLUTION ORAL at 14:22

## 2020-02-24 RX ADMIN — Medication 975 MILLIGRAM(S): at 14:44

## 2020-02-24 RX ADMIN — Medication 975 MILLIGRAM(S): at 06:56

## 2020-02-24 RX ADMIN — Medication 975 MILLIGRAM(S): at 23:39

## 2020-02-24 RX ADMIN — Medication 975 MILLIGRAM(S): at 22:07

## 2020-02-24 RX ADMIN — MAGNESIUM HYDROXIDE 30 MILLILITER(S): 400 TABLET, CHEWABLE ORAL at 12:08

## 2020-02-24 RX ADMIN — LOSARTAN POTASSIUM 25 MILLIGRAM(S): 100 TABLET, FILM COATED ORAL at 06:00

## 2020-02-24 RX ADMIN — ENOXAPARIN SODIUM 40 MILLIGRAM(S): 100 INJECTION SUBCUTANEOUS at 12:07

## 2020-02-24 RX ADMIN — Medication 2000 UNIT(S): at 12:07

## 2020-02-24 RX ADMIN — Medication 25 MILLIGRAM(S): at 17:05

## 2020-02-24 RX ADMIN — Medication 975 MILLIGRAM(S): at 06:00

## 2020-02-24 RX ADMIN — Medication 25 MILLIGRAM(S): at 05:59

## 2020-02-24 RX ADMIN — Medication 975 MILLIGRAM(S): at 14:21

## 2020-02-24 NOTE — PROGRESS NOTE ADULT - SUBJECTIVE AND OBJECTIVE BOX
Patient seen and examined at bedside, resting comfortably. No overnight events. Pain well controlled. Denies headache, lightheadedness, dizziness, chest pain, dyspnea, n/v, numbness/tingling. No complaints at this time.     VITAL SIGNS  T(C): 36.6 (02-24-20 @ 06:04), Max: 37.5 (02-23-20 @ 13:35)  HR: 72 (02-24-20 @ 06:04) (65 - 77)  BP: 136/68 (02-24-20 @ 06:04) (132/53 - 166/66)  RR: 16 (02-24-20 @ 06:04) (16 - 19)  SpO2: 98% (02-24-20 @ 06:04) (95% - 98%)    LABS:                        12.8   16.29 )-----------( 226      ( 23 Feb 2020 06:40 )             40.5     02-23    137  |  106  |  11  ----------------------------<  145<H>  4.7   |  24  |  0.92    Ca    10.5<H>      23 Feb 2020 06:40    TPro  x   /  Alb  3.6  /  TBili  x   /  DBili  x   /  AST  x   /  ALT  x   /  AlkPhos  x   02-22    PT/INR - ( 22 Feb 2020 13:50 )   PT: 13.2 sec;   INR: 1.17 ratio         PTT - ( 22 Feb 2020 13:50 )  PTT:29.6 sec        PHYSICAL EXAM  General: NAD, Awake and Alert    RLE:  Dressing c/d/i  Abduction pillow in place  SCDs in place  L2-S1 SILT  +TA/EHL/FHL/GSC  + DP/PT pulses  Compartments soft and compressible  Calves nontender

## 2020-02-24 NOTE — PROGRESS NOTE ADULT - SUBJECTIVE AND OBJECTIVE BOX
85 yo female w/ history of HTN and HLD p/w right hip fracture status post fall. She is a poor historian and unable to provide further detail. She reports mild right hip pain. She is lying in bed in NAD. Nurse reports patient is retaining urine.      MEDICATIONS  (STANDING):  acetaminophen   Tablet .. 975 milliGRAM(s) Oral every 8 hours  calcium carbonate 1250 mG  + Vitamin D (OsCal 500 + D) 1 Tablet(s) Oral daily  cholecalciferol 2000 Unit(s) Oral daily  enoxaparin Injectable 40 milliGRAM(s) SubCutaneous every 24 hours  losartan 25 milliGRAM(s) Oral daily  metoprolol tartrate 25 milliGRAM(s) Oral two times a day  polyethylene glycol 3350 17 Gram(s) Oral daily  senna 2 Tablet(s) Oral at bedtime    MEDICATIONS  (PRN):  benzocaine 15 mG/menthol 3.6 mG (Sugar-Free) Lozenge 1 Lozenge Oral three times a day PRN Sore Throat  magnesium hydroxide Suspension 30 milliLiter(s) Oral daily PRN Constipation  melatonin 3 milliGRAM(s) Oral at bedtime PRN Insomnia  ondansetron Injectable 4 milliGRAM(s) IV Push every 6 hours PRN Nausea and/or Vomiting  oxyCODONE    IR 2.5 milliGRAM(s) Oral every 4 hours PRN Moderate Pain (4 - 6)  oxyCODONE    IR 5 milliGRAM(s) Oral every 4 hours PRN Severe Pain (7 - 10)      Allergies    No Known Allergies    Intolerances        Vital Signs Last 24 Hrs  T(C): 36.7 (24 Feb 2020 17:05), Max: 36.9 (24 Feb 2020 12:18)  T(F): 98 (24 Feb 2020 17:05), Max: 98.4 (24 Feb 2020 12:18)  HR: 97 (24 Feb 2020 16:31) (70 - 97)  BP: 170/80 (24 Feb 2020 16:31) (136/68 - 170/80)  BP(mean): --  RR: 19 (24 Feb 2020 16:31) (16 - 20)  SpO2: 96% (24 Feb 2020 16:31) (96% - 98%)    PHYSICAL EXAM:  GENERAL: NAD, well-groomed, well-developed  HEAD:  Atraumatic, Normocephalic  EYES: EOMI, PERRLA,   NECK: Supple   NERVOUS SYSTEM: Alert   CHEST/LUNG: Clear to auscultation bilaterally; No rales, rhonchi, wheezing, or rubs  HEART: Regular rate and rhythm; No murmurs, rubs, or gallops  ABDOMEN: Soft, Nontender, Nondistended; Bowel sounds present  EXTREMITIES:  No clubbing, cyanosis, or edema       LABS:                        11.8   16.80 )-----------( 207      ( 24 Feb 2020 06:31 )             36.9     02-24    141  |  109<H>  |  19  ----------------------------<  129<H>  4.5   |  26  |  0.95    Ca    11.4<H>      24 Feb 2020 06:31          CAPILLARY BLOOD GLUCOSE          RADIOLOGY & ADDITIONAL TESTS:    02-23-20 @ 07:01  -  02-24-20 @ 07:00  --------------------------------------------------------  IN:    Oral Fluid: 240 mL    sodium chloride 0.9%: 262.5 mL    Solution: 50 mL  Total IN: 552.5 mL    OUT:  Total OUT: 0 mL    Total NET: 552.5 mL

## 2020-02-24 NOTE — PROGRESS NOTE ADULT - ASSESSMENT
85 yo female w/ history of HTN and HLD p/w right hip fracture status post fall. She is a poor historian and unable to provide further detail. She reports mild right hip pain.    Right hip fracture   - status post R hemiarthroplasty on 2/21  - defer wound care, pain management and DVT prophylaxis to surgery    Hypovitaminosis D   - Vit D level 23.2  -  c/w Cholecalciferol     Urinary retention  - patient may need barbosa, defer to primary    Leukocytosis     - likely reactive to fracture & surgery  - patient was on Ancef as per surgery    HLD  - recommend restarting simvastatin    HTN  - c/w metoprolol and losartan    Recent UTI  - patient had 2 more days of Bactrim, but was given Ancef today due to the surgery

## 2020-02-24 NOTE — PROGRESS NOTE ADULT - ASSESSMENT
86y Female s/p RIGHT hip hemiarthroplasty POD #2    -Pain control  -DVT ppx: Lovenox  -WBAT/OOB with assistance as needed  -PT/OT  -Posterior hip precautions, abduction pillow while in laying bed and sitting  -Ice as needed  -Encourage incentive spirometry  -Medical management per primary team  -DC planning: JESSICA  -Will discuss with attending and will advise if plan changes.

## 2020-02-25 ENCOUNTER — TRANSCRIPTION ENCOUNTER (OUTPATIENT)
Age: 85
End: 2020-02-25

## 2020-02-25 VITALS — RESPIRATION RATE: 18 BRPM | HEART RATE: 79 BPM | OXYGEN SATURATION: 97 %

## 2020-02-25 LAB
ANION GAP SERPL CALC-SCNC: 4 MMOL/L — LOW (ref 5–17)
BUN SERPL-MCNC: 16 MG/DL — SIGNIFICANT CHANGE UP (ref 7–23)
CALCIUM SERPL-MCNC: 11.6 MG/DL — HIGH (ref 8.5–10.1)
CHLORIDE SERPL-SCNC: 109 MMOL/L — HIGH (ref 96–108)
CO2 SERPL-SCNC: 26 MMOL/L — SIGNIFICANT CHANGE UP (ref 22–31)
CREAT SERPL-MCNC: 0.76 MG/DL — SIGNIFICANT CHANGE UP (ref 0.5–1.3)
GLUCOSE SERPL-MCNC: 117 MG/DL — HIGH (ref 70–99)
HCT VFR BLD CALC: 35.2 % — SIGNIFICANT CHANGE UP (ref 34.5–45)
HGB BLD-MCNC: 11.1 G/DL — LOW (ref 11.5–15.5)
MCHC RBC-ENTMCNC: 30.1 PG — SIGNIFICANT CHANGE UP (ref 27–34)
MCHC RBC-ENTMCNC: 31.5 GM/DL — LOW (ref 32–36)
MCV RBC AUTO: 95.4 FL — SIGNIFICANT CHANGE UP (ref 80–100)
NRBC # BLD: 0 /100 WBCS — SIGNIFICANT CHANGE UP (ref 0–0)
PLATELET # BLD AUTO: 211 K/UL — SIGNIFICANT CHANGE UP (ref 150–400)
POTASSIUM SERPL-MCNC: 4.4 MMOL/L — SIGNIFICANT CHANGE UP (ref 3.5–5.3)
POTASSIUM SERPL-SCNC: 4.4 MMOL/L — SIGNIFICANT CHANGE UP (ref 3.5–5.3)
RBC # BLD: 3.69 M/UL — LOW (ref 3.8–5.2)
RBC # FLD: 14.1 % — SIGNIFICANT CHANGE UP (ref 10.3–14.5)
SODIUM SERPL-SCNC: 139 MMOL/L — SIGNIFICANT CHANGE UP (ref 135–145)
WBC # BLD: 15.94 K/UL — HIGH (ref 3.8–10.5)
WBC # FLD AUTO: 15.94 K/UL — HIGH (ref 3.8–10.5)

## 2020-02-25 RX ADMIN — Medication 975 MILLIGRAM(S): at 06:12

## 2020-02-25 RX ADMIN — ENOXAPARIN SODIUM 40 MILLIGRAM(S): 100 INJECTION SUBCUTANEOUS at 11:35

## 2020-02-25 RX ADMIN — Medication 1 TABLET(S): at 11:35

## 2020-02-25 RX ADMIN — Medication 975 MILLIGRAM(S): at 15:13

## 2020-02-25 RX ADMIN — POLYETHYLENE GLYCOL 3350 17 GRAM(S): 17 POWDER, FOR SOLUTION ORAL at 11:36

## 2020-02-25 RX ADMIN — Medication 975 MILLIGRAM(S): at 07:42

## 2020-02-25 RX ADMIN — Medication 25 MILLIGRAM(S): at 06:12

## 2020-02-25 RX ADMIN — Medication 1000 UNIT(S): at 11:34

## 2020-02-25 RX ADMIN — LOSARTAN POTASSIUM 25 MILLIGRAM(S): 100 TABLET, FILM COATED ORAL at 06:14

## 2020-02-25 NOTE — PROGRESS NOTE ADULT - SUBJECTIVE AND OBJECTIVE BOX
Patient seen and examined at bedside, resting comfortably. No overnight events. Pain well controlled. Denies headache, lightheadedness, dizziness, chest pain, dyspnea, n/v, numbness/tingling. No complaints at this time. Pt has not been able to urinate.    Vital Signs Last 24 Hrs  T(C): 37.4 (25 Feb 2020 06:00), Max: 37.4 (25 Feb 2020 06:00)  T(F): 99.4 (25 Feb 2020 06:00), Max: 99.4 (25 Feb 2020 06:00)  HR: 78 (25 Feb 2020 06:00) (70 - 97)  BP: 119/56 (25 Feb 2020 06:00) (119/56 - 170/80)  BP(mean): --  RR: 16 (25 Feb 2020 06:00) (16 - 28)  SpO2: 100% (25 Feb 2020 06:00) (96% - 100%)    PHYSICAL EXAM  General: NAD, Awake and Alert    RLE:  Dressing c/d/i  Abduction pillow in place  SCDs in place  No calf ttp  L2-S1 SILT  +TA/EHL/FHL/GSC  + DP/PT pulses  Compartments soft and compressible

## 2020-02-25 NOTE — DISCHARGE NOTE NURSING/CASE MANAGEMENT/SOCIAL WORK - PATIENT PORTAL LINK FT
You can access the FollowMyHealth Patient Portal offered by [facility] by registering at the following website: [email or web address]. By joining [employer]’s FollowMyHealth portal, you will also be able to view your health information using other applications (apps) compatible with our system.

## 2020-02-25 NOTE — PROGRESS NOTE ADULT - ATTENDING COMMENTS
pod 1 s/p R hip susan.  pt.  wbat hip prec.  dvtp. pain control. med mgmt. dispo planning-JESSICA once auth obtained
patient seen and examined at bedside this AM. inc c/d/i.  PT-wbat/hip prec. dvtp. pain control. med recs appreciated, s/p cath for urinary retention.  dispo JESSICA today

## 2020-02-25 NOTE — PROGRESS NOTE ADULT - ASSESSMENT
86y Female s/p RIGHT hip hemiarthroplasty POD #3:    -Pain control  -DVT ppx: Lovenox  -WBAT/OOB with assistance as needed  -PT/OT  -Posterior hip precautions, abduction pillow while in laying bed and sitting  -Ice as needed  -Encourage incentive spirometry  -Medical management per primary team  -DC planning: JESSICA, pending authorization  -Will discuss with attending and will advise if plan changes. 86y Female s/p RIGHT hip hemiarthroplasty POD #3:  -straight cath'd x2 for residual urine  -Pain control  -DVT ppx: Lovenox  -WBAT/OOB with assistance as needed  -PT/OT  -Posterior hip precautions, abduction pillow while in laying bed and sitting  -Ice as needed  -Encourage incentive spirometry  -Medical management per primary team  -DC planning: JESSICA, pending authorization  -Will discuss with attending and will advise if plan changes.

## 2020-02-25 NOTE — DISCHARGE NOTE NURSING/CASE MANAGEMENT/SOCIAL WORK - NSDCPELOVENOX_GEN_ALL_CORE
Enoxaparin/Lovenox - Potential for adverse drug reactions and interactions/Enoxaparin/Lovenox - Compliance/Enoxaparin/Lovenox - Follow up monitoring/Enoxaparin/Lovenox - Dietary Advice

## 2020-02-25 NOTE — DISCHARGE NOTE NURSING/CASE MANAGEMENT/SOCIAL WORK - NSDCPNDISPN_GEN_ALL_CORE
Side effects of pain management treatment/Opioids not applicable/not prescribed/Safe use, storage and disposal of opioids when prescribed/Activities of daily living, including home environment that might     exacerbate pain or reduce effectiveness of the pain management plan of care as well as strategies to address these issues/Education provided on the pain management plan of care

## 2020-02-26 LAB — SURGICAL PATHOLOGY STUDY: SIGNIFICANT CHANGE UP

## 2020-03-02 DIAGNOSIS — I10 ESSENTIAL (PRIMARY) HYPERTENSION: ICD-10-CM
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
## 2020-03-02 DIAGNOSIS — E55.9 VITAMIN D DEFICIENCY, UNSPECIFIED: ICD-10-CM

## 2020-03-02 DIAGNOSIS — S72.041A DISPLACED FRACTURE OF BASE OF NECK OF RIGHT FEMUR, INITIAL ENCOUNTER FOR CLOSED FRACTURE: ICD-10-CM

## 2020-03-02 DIAGNOSIS — E78.5 HYPERLIPIDEMIA, UNSPECIFIED: ICD-10-CM

## 2020-03-02 DIAGNOSIS — Z96.642 PRESENCE OF LEFT ARTIFICIAL HIP JOINT: ICD-10-CM
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
## 2020-03-20 PROBLEM — I10 ESSENTIAL (PRIMARY) HYPERTENSION: Chronic | Status: ACTIVE | Noted: 2020-02-22

## 2020-03-21 ENCOUNTER — OUTPATIENT (OUTPATIENT)
Dept: OUTPATIENT SERVICES | Facility: HOSPITAL | Age: 85
LOS: 1 days | Discharge: HOME | End: 2020-03-21
Payer: MEDICARE

## 2020-03-21 DIAGNOSIS — Z47.1 AFTERCARE FOLLOWING JOINT REPLACEMENT SURGERY: ICD-10-CM

## 2020-03-21 PROCEDURE — 73502 X-RAY EXAM HIP UNI 2-3 VIEWS: CPT | Mod: 26,RT

## 2020-04-03 ENCOUNTER — INPATIENT (INPATIENT)
Facility: HOSPITAL | Age: 85
LOS: 5 days | Discharge: NURSING, REHAB OR LONG-TERM CARE | End: 2020-04-09
Attending: FAMILY MEDICINE
Payer: MEDICARE

## 2020-04-03 ENCOUNTER — EMERGENCY (EMERGENCY)
Facility: HOSPITAL | Age: 85
LOS: 1 days | Discharge: HOME | End: 2020-04-03
Attending: EMERGENCY MEDICINE | Admitting: INTERNAL MEDICINE
Payer: MEDICARE

## 2020-04-03 ENCOUNTER — OUTPATIENT (OUTPATIENT)
Dept: OUTPATIENT SERVICES | Facility: HOSPITAL | Age: 85
LOS: 1 days | End: 2020-04-03

## 2020-04-03 VITALS — HEART RATE: 72 BPM | SYSTOLIC BLOOD PRESSURE: 139 MMHG | DIASTOLIC BLOOD PRESSURE: 68 MMHG

## 2020-04-03 VITALS
RESPIRATION RATE: 28 BRPM | HEART RATE: 96 BPM | DIASTOLIC BLOOD PRESSURE: 84 MMHG | TEMPERATURE: 98 F | OXYGEN SATURATION: 95 % | SYSTOLIC BLOOD PRESSURE: 149 MMHG

## 2020-04-03 DIAGNOSIS — E87.0 HYPEROSMOLALITY AND HYPERNATREMIA: ICD-10-CM

## 2020-04-03 DIAGNOSIS — I10 ESSENTIAL (PRIMARY) HYPERTENSION: ICD-10-CM

## 2020-04-03 DIAGNOSIS — E78.5 HYPERLIPIDEMIA, UNSPECIFIED: ICD-10-CM

## 2020-04-03 DIAGNOSIS — N39.0 URINARY TRACT INFECTION, SITE NOT SPECIFIED: ICD-10-CM

## 2020-04-03 DIAGNOSIS — G93.41 METABOLIC ENCEPHALOPATHY: ICD-10-CM

## 2020-04-03 DIAGNOSIS — E83.52 HYPERCALCEMIA: ICD-10-CM

## 2020-04-03 DIAGNOSIS — Z29.9 ENCOUNTER FOR PROPHYLACTIC MEASURES, UNSPECIFIED: ICD-10-CM

## 2020-04-03 DIAGNOSIS — N17.9 ACUTE KIDNEY FAILURE, UNSPECIFIED: ICD-10-CM

## 2020-04-03 DIAGNOSIS — A41.9 SEPSIS, UNSPECIFIED ORGANISM: ICD-10-CM

## 2020-04-03 DIAGNOSIS — R41.82 ALTERED MENTAL STATUS, UNSPECIFIED: ICD-10-CM

## 2020-04-03 DIAGNOSIS — E86.0 DEHYDRATION: ICD-10-CM

## 2020-04-03 DIAGNOSIS — U07.1 COVID-19: ICD-10-CM

## 2020-04-03 DIAGNOSIS — N30.00 ACUTE CYSTITIS WITHOUT HEMATURIA: ICD-10-CM

## 2020-04-03 LAB
ALBUMIN SERPL ELPH-MCNC: 3.6 G/DL — SIGNIFICANT CHANGE UP (ref 3.3–5)
ALP SERPL-CCNC: 94 U/L — SIGNIFICANT CHANGE UP (ref 40–120)
ALT FLD-CCNC: 93 U/L — HIGH (ref 12–78)
ANION GAP SERPL CALC-SCNC: 7 MMOL/L — SIGNIFICANT CHANGE UP (ref 5–17)
APPEARANCE UR: ABNORMAL
AST SERPL-CCNC: 89 U/L — HIGH (ref 15–37)
BASOPHILS # BLD AUTO: 0.02 K/UL — SIGNIFICANT CHANGE UP (ref 0–0.2)
BASOPHILS NFR BLD AUTO: 0.1 % — SIGNIFICANT CHANGE UP (ref 0–2)
BILIRUB SERPL-MCNC: 0.8 MG/DL — SIGNIFICANT CHANGE UP (ref 0.2–1.2)
BILIRUB UR-MCNC: NEGATIVE — SIGNIFICANT CHANGE UP
BUN SERPL-MCNC: 68 MG/DL — HIGH (ref 7–23)
CALCIUM SERPL-MCNC: 16.8 MG/DL — CRITICAL HIGH (ref 8.5–10.1)
CHLORIDE SERPL-SCNC: 127 MMOL/L — HIGH (ref 96–108)
CO2 SERPL-SCNC: 31 MMOL/L — SIGNIFICANT CHANGE UP (ref 22–31)
COLOR SPEC: YELLOW — SIGNIFICANT CHANGE UP
COMMENT - URINE: SIGNIFICANT CHANGE UP
CREAT SERPL-MCNC: 2.11 MG/DL — HIGH (ref 0.5–1.3)
DIFF PNL FLD: ABNORMAL
EOSINOPHIL # BLD AUTO: 0 K/UL — SIGNIFICANT CHANGE UP (ref 0–0.5)
EOSINOPHIL NFR BLD AUTO: 0 % — SIGNIFICANT CHANGE UP (ref 0–6)
EPI CELLS # UR: ABNORMAL
FLU A RESULT: SIGNIFICANT CHANGE UP
FLU A RESULT: SIGNIFICANT CHANGE UP
FLUAV AG NPH QL: SIGNIFICANT CHANGE UP
FLUBV AG NPH QL: SIGNIFICANT CHANGE UP
GLUCOSE SERPL-MCNC: 135 MG/DL — HIGH (ref 70–99)
GLUCOSE UR QL: NEGATIVE MG/DL — SIGNIFICANT CHANGE UP
HCT VFR BLD CALC: 53.1 % — HIGH (ref 34.5–45)
HGB BLD-MCNC: 15.7 G/DL — HIGH (ref 11.5–15.5)
IMM GRANULOCYTES NFR BLD AUTO: 0.4 % — SIGNIFICANT CHANGE UP (ref 0–1.5)
KETONES UR-MCNC: NEGATIVE — SIGNIFICANT CHANGE UP
LACTATE SERPL-SCNC: 3 MMOL/L — HIGH (ref 0.7–2)
LACTATE SERPL-SCNC: 3.3 MMOL/L — HIGH (ref 0.7–2)
LEUKOCYTE ESTERASE UR-ACNC: ABNORMAL
LYMPHOCYTES # BLD AUTO: 1.42 K/UL — SIGNIFICANT CHANGE UP (ref 1–3.3)
LYMPHOCYTES # BLD AUTO: 10 % — LOW (ref 13–44)
MAGNESIUM SERPL-MCNC: 2.6 MG/DL — SIGNIFICANT CHANGE UP (ref 1.6–2.6)
MCHC RBC-ENTMCNC: 29.2 PG — SIGNIFICANT CHANGE UP (ref 27–34)
MCHC RBC-ENTMCNC: 29.6 GM/DL — LOW (ref 32–36)
MCV RBC AUTO: 98.9 FL — SIGNIFICANT CHANGE UP (ref 80–100)
MONOCYTES # BLD AUTO: 0.72 K/UL — SIGNIFICANT CHANGE UP (ref 0–0.9)
MONOCYTES NFR BLD AUTO: 5.1 % — SIGNIFICANT CHANGE UP (ref 2–14)
NEUTROPHILS # BLD AUTO: 12.03 K/UL — HIGH (ref 1.8–7.4)
NEUTROPHILS NFR BLD AUTO: 84.4 % — HIGH (ref 43–77)
NITRITE UR-MCNC: NEGATIVE — SIGNIFICANT CHANGE UP
NRBC # BLD: 0 /100 WBCS — SIGNIFICANT CHANGE UP (ref 0–0)
PH UR: 7 — SIGNIFICANT CHANGE UP (ref 5–8)
PLATELET # BLD AUTO: 216 K/UL — SIGNIFICANT CHANGE UP (ref 150–400)
POTASSIUM SERPL-MCNC: 3.6 MMOL/L — SIGNIFICANT CHANGE UP (ref 3.5–5.3)
POTASSIUM SERPL-SCNC: 3.6 MMOL/L — SIGNIFICANT CHANGE UP (ref 3.5–5.3)
PROT SERPL-MCNC: 9.4 GM/DL — HIGH (ref 6–8.3)
PROT UR-MCNC: 100 MG/DL
RBC # BLD: 5.37 M/UL — HIGH (ref 3.8–5.2)
RBC # FLD: 14 % — SIGNIFICANT CHANGE UP (ref 10.3–14.5)
RBC CASTS # UR COMP ASSIST: ABNORMAL /HPF (ref 0–4)
RSV RESULT: SIGNIFICANT CHANGE UP
RSV RNA RESP QL NAA+PROBE: SIGNIFICANT CHANGE UP
SARS-COV-2 RNA SPEC QL NAA+PROBE: DETECTED
SODIUM SERPL-SCNC: 165 MMOL/L — CRITICAL HIGH (ref 135–145)
SP GR SPEC: 1.01 — SIGNIFICANT CHANGE UP (ref 1.01–1.02)
UROBILINOGEN FLD QL: NEGATIVE MG/DL — SIGNIFICANT CHANGE UP
WBC # BLD: 14.25 K/UL — HIGH (ref 3.8–10.5)
WBC # FLD AUTO: 14.25 K/UL — HIGH (ref 3.8–10.5)
WBC UR QL: ABNORMAL

## 2020-04-03 PROCEDURE — 99285 EMERGENCY DEPT VISIT HI MDM: CPT

## 2020-04-03 PROCEDURE — 71045 X-RAY EXAM CHEST 1 VIEW: CPT | Mod: 26

## 2020-04-03 PROCEDURE — 70450 CT HEAD/BRAIN W/O DYE: CPT | Mod: 26

## 2020-04-03 PROCEDURE — 71045 X-RAY EXAM CHEST 1 VIEW: CPT | Mod: 26,77

## 2020-04-03 PROCEDURE — 99223 1ST HOSP IP/OBS HIGH 75: CPT

## 2020-04-03 PROCEDURE — 99291 CRITICAL CARE FIRST HOUR: CPT

## 2020-04-03 PROCEDURE — 70450 CT HEAD/BRAIN W/O DYE: CPT | Mod: 26,77

## 2020-04-03 PROCEDURE — 93010 ELECTROCARDIOGRAM REPORT: CPT

## 2020-04-03 RX ORDER — HEPARIN SODIUM 5000 [USP'U]/ML
5000 INJECTION INTRAVENOUS; SUBCUTANEOUS EVERY 12 HOURS
Refills: 0 | Status: DISCONTINUED | OUTPATIENT
Start: 2020-04-03 | End: 2020-04-03

## 2020-04-03 RX ORDER — SODIUM CHLORIDE 9 MG/ML
2000 INJECTION, SOLUTION INTRAVENOUS
Refills: 0 | Status: DISCONTINUED | OUTPATIENT
Start: 2020-04-03 | End: 2020-04-03

## 2020-04-03 RX ORDER — CEFTRIAXONE 500 MG/1
1000 INJECTION, POWDER, FOR SOLUTION INTRAMUSCULAR; INTRAVENOUS ONCE
Refills: 0 | Status: COMPLETED | OUTPATIENT
Start: 2020-04-03 | End: 2020-04-03

## 2020-04-03 RX ORDER — METOPROLOL TARTRATE 50 MG
25 TABLET ORAL
Refills: 0 | Status: DISCONTINUED | OUTPATIENT
Start: 2020-04-03 | End: 2020-04-03

## 2020-04-03 RX ORDER — HYDRALAZINE HCL 50 MG
10 TABLET ORAL ONCE
Refills: 0 | Status: COMPLETED | OUTPATIENT
Start: 2020-04-03 | End: 2020-04-03

## 2020-04-03 RX ORDER — CEFTRIAXONE 500 MG/1
2000 INJECTION, POWDER, FOR SOLUTION INTRAMUSCULAR; INTRAVENOUS ONCE
Refills: 0 | Status: DISCONTINUED | OUTPATIENT
Start: 2020-04-03 | End: 2020-04-03

## 2020-04-03 RX ORDER — ZINC SULFATE TAB 220 MG (50 MG ZINC EQUIVALENT) 220 (50 ZN) MG
220 TAB ORAL DAILY
Refills: 0 | Status: DISCONTINUED | OUTPATIENT
Start: 2020-04-03 | End: 2020-04-03

## 2020-04-03 RX ORDER — PAMIDRONATE DISODIUM 9 MG/ML
90 INJECTION, SOLUTION INTRAVENOUS ONCE
Refills: 0 | Status: COMPLETED | OUTPATIENT
Start: 2020-04-03 | End: 2020-04-03

## 2020-04-03 RX ORDER — ASCORBIC ACID 60 MG
500 TABLET,CHEWABLE ORAL DAILY
Refills: 0 | Status: DISCONTINUED | OUTPATIENT
Start: 2020-04-03 | End: 2020-04-03

## 2020-04-03 RX ORDER — SODIUM CHLORIDE 9 MG/ML
1500 INJECTION INTRAMUSCULAR; INTRAVENOUS; SUBCUTANEOUS ONCE
Refills: 0 | Status: COMPLETED | OUTPATIENT
Start: 2020-04-03 | End: 2020-04-03

## 2020-04-03 RX ORDER — PAMIDRONATE DISODIUM 9 MG/ML
60 INJECTION, SOLUTION INTRAVENOUS ONCE
Refills: 0 | Status: DISCONTINUED | OUTPATIENT
Start: 2020-04-03 | End: 2020-04-03

## 2020-04-03 RX ORDER — ZOLEDRONIC ACID 5 MG/100ML
4 INJECTION, SOLUTION INTRAVENOUS ONCE
Refills: 0 | Status: DISCONTINUED | OUTPATIENT
Start: 2020-04-03 | End: 2020-04-03

## 2020-04-03 RX ORDER — CEFTRIAXONE 500 MG/1
1000 INJECTION, POWDER, FOR SOLUTION INTRAMUSCULAR; INTRAVENOUS EVERY 24 HOURS
Refills: 0 | Status: DISCONTINUED | OUTPATIENT
Start: 2020-04-04 | End: 2020-04-03

## 2020-04-03 RX ADMIN — CEFTRIAXONE 100 MILLIGRAM(S): 500 INJECTION, POWDER, FOR SOLUTION INTRAMUSCULAR; INTRAVENOUS at 13:43

## 2020-04-03 RX ADMIN — Medication 10 MILLIGRAM(S): at 16:00

## 2020-04-03 RX ADMIN — PAMIDRONATE DISODIUM 70 MILLIGRAM(S): 9 INJECTION, SOLUTION INTRAVENOUS at 15:06

## 2020-04-03 RX ADMIN — SODIUM CHLORIDE 250 MILLILITER(S): 9 INJECTION, SOLUTION INTRAVENOUS at 15:15

## 2020-04-03 NOTE — H&P ADULT - PROBLEM SELECTOR PLAN 2
presumed due to the above and possibly uti presumed due to the above and possibly Urinary Tract Infection:  r/o covid

## 2020-04-03 NOTE — ED ADULT NURSE NOTE - CHIEF COMPLAINT QUOTE
weakness, confusion, not eating or drinking. visiting nurse noted with shortness of breath as well this am.  history of htn and high cholesterol. recent orthopedic surgery with orzac admit.

## 2020-04-03 NOTE — H&P ADULT - ASSESSMENT
87 y/o female with presentations of metabolic encephalopathy devan, hypernatremia UTI and d r/o covid-19

## 2020-04-03 NOTE — H&P ADULT - HISTORY OF PRESENT ILLNESS
86F hx of htn, hld, and recent right hip orif in feb presents with ams. for the past week since she returned the patient has been declining. the visiting nurse saw her at home and thought she might have a uti. nurse saw her today and she looks like she was tachypneic so she called 911  	Fh and Sh not otherwise contributory

## 2020-04-03 NOTE — ED ADULT NURSE REASSESSMENT NOTE - NS ED NURSE REASSESS COMMENT FT1
Patient being transferred to [place]. [facility] here. Patient being sent with Pamidronate running. No signs of acute distress. Patient responds to painful stimulus. Vitals stable.

## 2020-04-03 NOTE — H&P ADULT - NSHPPHYSICALEXAM_GEN_ALL_CORE
Vital Signs Last 24 Hrs  T(C): 36.9 (03 Apr 2020 12:17), Max: 36.9 (03 Apr 2020 12:17)  T(F): 98.4 (03 Apr 2020 12:17), Max: 98.4 (03 Apr 2020 12:17)  HR: 72 (03 Apr 2020 16:28) (72 - 96)  BP: 139/68 (03 Apr 2020 16:28) (139/68 - 211/104)  BP(mean): --  RR: 24 (03 Apr 2020 15:44) (24 - 28)  SpO2: 98% (03 Apr 2020 15:44) (95% - 98%)

## 2020-04-03 NOTE — ED PROVIDER NOTE - SECONDARY DIAGNOSIS.
Urinary tract infection without hematuria, site unspecified Hypercalcemia Acute renal failure, unspecified acute renal failure type

## 2020-04-03 NOTE — ED PROVIDER NOTE - PHYSICAL EXAMINATION
Gen: weak, frail, unresponsive   Head: NC, AT   Eyes: closed.   ENT: dry  Neck: supple, no tenderness, Trachea midline  Pulm: crackles, tachypnea    CV: RRR  Abd: soft, NT/ND  Mskel: cachectic   Skin: no rash, no bruising   Neuro: not following  commands. withdraws to pain

## 2020-04-03 NOTE — ED ADULT NURSE NOTE - NSIMPLEMENTINTERV_GEN_ALL_ED
Implemented All Fall with Harm Risk Interventions:  [place] to call system. Call bell, personal items and telephone within reach. Instruct patient to call for assistance. Room bathroom lighting operational. Non-slip footwear when patient is off stretcher. Physically safe environment: no spills, clutter or unnecessary equipment. Stretcher in lowest position, wheels locked, appropriate side rails in place. Provide visual cue, wrist band, yellow gown, etc. Monitor gait and stability. Monitor for mental status changes and reorient to person, place, and time. Review medications for side effects contributing to fall risk. Reinforce activity limits and safety measures with patient and family. Provide visual clues: red socks.

## 2020-04-03 NOTE — H&P ADULT - NSHPLABSRESULTS_GEN_ALL_CORE
15.7   14.25 )-----------( 216      ( 03 Apr 2020 13:07 )             53.1   04-03    165<HH>  |  127<H>  |  68<H>  ----------------------------<  135<H>  3.6   |  31  |  2.11<H>    Ca    16.8<HH>      03 Apr 2020 13:07  Mg     2.6     04-03    TPro  9.4<H>  /  Alb  3.6  /  TBili  0.8  /  DBili  x   /  AST  89<H>  /  ALT  93<H>  /  AlkPhos  94  04-03  < from: CT Head No Cont (04.03.20 @ 16:04) >    Impression:  1. Unremarkable noncontrast CT scan of the brain.        < from: Xray Chest 1 View- PORTABLE-Urgent (04.03.20 @ 13:13) >      IMPRESSION:  Fibrotic changes scattered in both lungs with no consolidation seen.

## 2020-04-03 NOTE — ED PROVIDER NOTE - OBJECTIVE STATEMENT
Pertinent PMH/PSH/FHx/SHx and Review of Systems contained within:  86F hx of htn, hld, and recent right hip orif in feb presents with ams. for the past week since she returned the patient has been declining. the visiting nurse saw her at home and thought she might have a uti. nurse saw her today and she looks like she was tachypneic so she called 911  Fh and Sh not otherwise contributory  ROS otherwise negative

## 2020-04-03 NOTE — ED PROVIDER NOTE - CARE PLAN
Principal Discharge DX:	Weakness Principal Discharge DX:	Unresponsive  Secondary Diagnosis:	Urinary tract infection without hematuria, site unspecified Principal Discharge DX:	Unresponsive  Secondary Diagnosis:	Urinary tract infection without hematuria, site unspecified  Secondary Diagnosis:	Hypercalcemia  Secondary Diagnosis:	Acute renal failure, unspecified acute renal failure type

## 2020-04-04 ENCOUNTER — OUTPATIENT (OUTPATIENT)
Dept: OUTPATIENT SERVICES | Facility: HOSPITAL | Age: 85
LOS: 1 days | End: 2020-04-04

## 2020-04-05 ENCOUNTER — OUTPATIENT (OUTPATIENT)
Dept: OUTPATIENT SERVICES | Facility: HOSPITAL | Age: 85
LOS: 1 days | End: 2020-04-05

## 2020-04-05 PROCEDURE — 99223 1ST HOSP IP/OBS HIGH 75: CPT

## 2020-04-06 ENCOUNTER — OUTPATIENT (OUTPATIENT)
Dept: OUTPATIENT SERVICES | Facility: HOSPITAL | Age: 85
LOS: 1 days | End: 2020-04-06

## 2020-04-06 PROCEDURE — 99232 SBSQ HOSP IP/OBS MODERATE 35: CPT

## 2020-04-07 ENCOUNTER — OUTPATIENT (OUTPATIENT)
Dept: OUTPATIENT SERVICES | Facility: HOSPITAL | Age: 85
LOS: 1 days | End: 2020-04-07

## 2020-04-07 LAB
CULTURE RESULTS: SIGNIFICANT CHANGE UP
CULTURE RESULTS: SIGNIFICANT CHANGE UP
SPECIMEN SOURCE: SIGNIFICANT CHANGE UP
SPECIMEN SOURCE: SIGNIFICANT CHANGE UP

## 2020-04-08 ENCOUNTER — OUTPATIENT (OUTPATIENT)
Dept: OUTPATIENT SERVICES | Facility: HOSPITAL | Age: 85
LOS: 1 days | End: 2020-04-08

## 2020-04-08 PROCEDURE — 71250 CT THORAX DX C-: CPT | Mod: 26

## 2020-04-08 PROCEDURE — 74176 CT ABD & PELVIS W/O CONTRAST: CPT | Mod: 26

## 2020-04-08 PROCEDURE — 76705 ECHO EXAM OF ABDOMEN: CPT | Mod: 26

## 2020-04-09 ENCOUNTER — INPATIENT (INPATIENT)
Facility: HOSPITAL | Age: 85
LOS: 0 days | End: 2020-04-10
Attending: INTERNAL MEDICINE | Admitting: INTERNAL MEDICINE

## 2020-04-09 ENCOUNTER — OUTPATIENT (OUTPATIENT)
Dept: OUTPATIENT SERVICES | Facility: HOSPITAL | Age: 85
LOS: 1 days | End: 2020-04-09

## 2020-08-06 NOTE — PROGRESS NOTE ADULT - SUBJECTIVE AND OBJECTIVE BOX
Pt seen & examined. Pain controlled. No acute events overnight  All vital signs stable (as per nursing flowsheet)  Gen: NAD  RLE:  Dressing clean D&I  +sensation L2-S1  +dorsiflexion/plantarflexion of ankle/hallux  +dorsalis pedis pulse  Soft compartments, - calf tenderness n/a

## 2022-11-04 NOTE — H&P ADULT - PROBLEM SELECTOR PROBLEM 2
Physical Therapy    Visit Type: treatment  Precautions:  Medical precautions:  fall risk;.   Lines:       Lines in chart and on patient reviewed, precautions maintained throughout session.  Lower Extremity:    Left:  weight bearing: as tolerated.  hip - posterior precautions    Hearing: no hearing deficits  Vision:     Current vision: wears glasses only for reading  Safety Measures: bed alarm and bed rails  SUBJECTIVE  Patient agreed to participate in therapy this date.  \"My left knee feels better when I move it. My left knee is shot and it has been a problem even before I came to get my hip surgery. \"  Patient / Family Goal: maximize function and return to previous functional status    Pain     At onset of session (out of 10): 0     OBJECTIVE     Cognitive Status   Affect/Behavior    - cooperative and calm  Orientation    - Oriented to: person, place, time and situation  Functional Communication   - Overall Status: within functional limits  Attention Span    - Attention: intact  Following Direction   - follows all commands and directions consistently  Safety Awareness/Insight   - decreased awareness of need for safety and decreased awareness of need for assistance  Awareness of Deficits   - fully aware of deficits    Posture:  - Standing: trunk lean anterior and trunk lean right       Sitting Balance  (MONTY = base of support)  Static      - Trial 1 details: with back unsupported, modified independent, with double UE support and with single UE support     - Trial 2 details: with back unsupported, modified independent, with double UE support and with single UE support    Standing Balance  (MONTY = base of support)  Firm Surface: Double Leg      - Static, Eyes Open       - Trial 1 details: with double UE support and contact guard     - Dynamic, Eyes Open       - Trial 1 details: with double UE support and contact guard       Bed Mobility  - Supine to sit: with tactile cues, with verbal cues, minimal assist  - Sit to  supine: with verbal cues, with tactile cues, minimal assist  Transfers  Assistive devices: 2-wheeled walker, gait belt  - Sit to stand: with verbal cues, with tactile cues, minimal assist       - Second Trial: minimal assist, with verbal cues  - Stand to sit: with verbal cues, with tactile cues, minimal assist       - Second Trial: minimal assist, with verbal cues    Ambulation / Gait  - Assistive device: gait belt and two-wheeled walker  - Distance (feet unless otherwise indicated): 32, 12, 8  - Assist Level: minimal assist, contact guard/touching/steadying assist, with verbal cues and with tactile cues  - Description: decreased ant/pace, decreased step length right, decreased stance time LLE and heavy reliance on BUE  Gait Analysis   - Limb Advancement/Swing Phase     • Trunk: forward lean and right lean        Interventions     Seated    Lower Extremity: Bilateral and left: toe raises and heel raises (L heel slides), AROM, 10 reps, 2 sets  Training provided: HEP training, activity tolerance, breathing/relaxation, gait training, safety training, use of assistive device, positioning, transfer training, bed mobility training and body mechanics  Hip precautions   Skilled input: Verbal instruction/cues, tactile instruction/cues and posture correction  Verbal Consent: Writer verbally educated and received verbal consent for hand placement, positioning of patient, and techniques to be performed today from patient for hand placement and palpation for techniques, clothing adjustments for techniques and therapist position for techniques as described above and how they are pertinent to the patient's plan of care.         ASSESSMENT   Impairments: activity tolerance, pain, safety awareness, strength, range of motion and balance  Functional Limitations: all functional mobility  Pt is able to perform amb w/ improved fluidity and fair standing balance. Pt now able to negotiate stairs w/ CGA w/ fairly good safety awareness.  Pt continues to require min vc/tc's for hand positioning w/ t/fs, as pt tried to put both hands on RW.          Discharge Recommendations  Recommendation for Discharge: PT IL: Patient is appropriate for daily Physical Therapy  PT/OT Mobility Equipment for Discharge: TBD      Pain at End of Session: RN informed on pain level  Pain: 0/10    Progress: progressing toward goals and improving as expected    • Skilled therapy is required to address these limitations in attempt to maximize the patient's independence.    Education Provided:   Learning Assessment:  - Primary learner: patient  - Are they ready to learn: yes  - Preferred learning style: verbal and demonstration  - Barriers to learning: no barriers apparent at this time  Education provided during session:  - Receiving Education: patient  - Results of above outlined education: Verbalizes understanding, Demonstrates understanding and Needs reinforcement    Patient at End of Session:   Location: in bed  Safety measures: equipment intact, lines intact, call light within reach, alarm system in place/re-engaged and bed rails x2  Handoff to: nurse    PLAN   Suggestions for next session as indicated: PT Frequency: Twice per day  Frequency Comments: MONSE, BID:orthoMONSE, 3/7      Interventions: balance, bed mobility, body mechanics, gait training, ROM, strengthening, HEP train/position, safety education, functional transfer training and stairs retraining Hip precautions  Agreement to plan and goals: patient agrees with goals and treatment plan        GOALS  Long Term Goals: (to be met by time of discharge from hospital)  State precautions: Patient able to state precautions independent.  Status: progressing/ongoing  Maintain precautions: Patient able to maintain precautions independent.  Status: progressing/ongoing  Sit to supine: Patient will complete sit to supine modified independent.  Supine to sit: Patient will complete supine to sit modified independent.  Sit to stand:  Patient will complete sit to stand transfer with 2-wheeled walker, modified independent.   Status: progressing/ongoing  Stand to sit: Patient will complete stand to sit transfer with 2-wheeled walker, modified independent.   Status: progressing/ongoing  Ambulation (even): Patient will ambulate on even surface for 50 feet with 2-wheeled walker, supervision.   Status: progressing/ongoing  Stairs: Patient will ambulate 8 steps with gait belt using two rails and using one rail, supervision.   Exercise home program: patient to demonstrate and state appropriate assist with exercises as instructed.     Documented in the chart in the following areas: Assessment.      Therapy procedure time and total treatment time can be found documented on the Time Entry flowsheet   Metabolic encephalopathy

## 2023-03-30 NOTE — PHYSICAL THERAPY INITIAL EVALUATION ADULT - ASSISTIVE DEVICE, REHAB EVAL
Render Note In Bullet Format When Appropriate: No Show Applicator Variable?: Yes Detail Level: Detailed Duration Of Freeze Thaw-Cycle (Seconds): 0 Post-Care Instructions: I reviewed with the patient in detail post-care instructions. Patient is to wear sunprotection, and avoid picking at any of the treated lesions. Pt may apply Vaseline to crusted or scabbing areas. Consent: The patient's consent was obtained including but not limited to risks of crusting, scabbing, blistering, scarring, darker or lighter pigmentary change, recurrence, incomplete removal and infection. bed rails

## 2023-05-16 NOTE — PRE-OP CHECKLIST - TEMPERATURE IN FAHRENHEIT (DEGREES F)
Lab Results   Component Value Date    EGFR 53 05/15/2023    EGFR 48 02/20/2023    EGFR 46 08/15/2022    CREATININE 1 40 (H) 05/15/2023    CREATININE 1 51 (H) 02/20/2023    CREATININE 1 56 (H) 08/15/2022   Patient encouraged to drink fluids and avoid nephrotoxic medications  99

## 2024-08-02 NOTE — ED PROVIDER NOTE - CRITICAL CARE PROVIDED
documentation/consult w/ pt's family directly relating to pts condition/interpretation of diagnostic studies/conducted a detailed discussion of DNR status/direct patient care (not related to procedure)/additional history taking will give in or if necessary/no (get order)

## 2025-02-19 NOTE — PHYSICAL THERAPY INITIAL EVALUATION ADULT - TRANSFER SAFETY CONCERNS NOTED: SIT/STAND, REHAB EVAL
<<--- Click to launch decreased safety awareness/decreased sequencing ability/decreased weight-shifting ability

## 2025-04-09 NOTE — PHYSICAL THERAPY INITIAL EVALUATION ADULT - IMPAIRED TRANSFERS: SIT/STAND, REHAB EVAL
Detail Level: Detailed Quality 431: Preventive Care And Screening: Unhealthy Alcohol Use - Screening: Patient not identified as an unhealthy alcohol user when screened for unhealthy alcohol use using a systematic screening method Quality 110: Preventive Care And Screening: Influenza Immunization: Influenza Immunization previously received during influenza season Quality 226: Preventive Care And Screening: Tobacco Use: Screening And Cessation Intervention: Patient screened for tobacco use and is an ex/non-smoker Quality 130: Documentation Of Current Medications In The Medical Record: Current Medications Documented decreased flexibility/impaired balance/pain/decreased strength/impaired postural control